# Patient Record
Sex: MALE | Race: WHITE | NOT HISPANIC OR LATINO | Employment: FULL TIME | URBAN - METROPOLITAN AREA
[De-identification: names, ages, dates, MRNs, and addresses within clinical notes are randomized per-mention and may not be internally consistent; named-entity substitution may affect disease eponyms.]

---

## 2017-05-18 ENCOUNTER — TELEPHONE (OUTPATIENT)
Dept: INFECTIOUS DISEASES | Facility: CLINIC | Age: 52
End: 2017-05-18

## 2017-05-18 DIAGNOSIS — Z21 HIV POSITIVE: Primary | ICD-10-CM

## 2017-05-18 DIAGNOSIS — Z12.5 PROSTATE CANCER SCREENING: ICD-10-CM

## 2017-05-18 NOTE — TELEPHONE ENCOUNTER
----- Message from Angelica Yang LPN sent at 5/18/2017  7:32 AM CDT -----  Contact: Oscar   tel:   [phone]       ----- Message -----     From: Veronica Lucas     Sent: 5/17/2017   4:52 PM       To: JOSÉ LUIS Amezcua's pt./    Asking if there are any tests to be done before he sees you on 6/28 at 3:30 pm /Will be on the [facility] on June 9th.  Asking if Dr. Du can see him on a Friday and he would like to do his labs on the [facility] instead of coming into [place].    Pls call ref. This. As per caller.

## 2017-06-10 ENCOUNTER — LAB VISIT (OUTPATIENT)
Dept: LAB | Facility: HOSPITAL | Age: 52
End: 2017-06-10
Attending: INTERNAL MEDICINE
Payer: COMMERCIAL

## 2017-06-10 DIAGNOSIS — Z21 HIV POSITIVE: ICD-10-CM

## 2017-06-10 DIAGNOSIS — Z12.5 PROSTATE CANCER SCREENING: ICD-10-CM

## 2017-06-10 LAB
ALBUMIN SERPL BCP-MCNC: 4 G/DL
ALP SERPL-CCNC: 85 U/L
ALT SERPL W/O P-5'-P-CCNC: 28 U/L
ANION GAP SERPL CALC-SCNC: 9 MMOL/L
AST SERPL-CCNC: 34 U/L
BASOPHILS # BLD AUTO: 0.02 K/UL
BASOPHILS NFR BLD: 0.4 %
BILIRUB SERPL-MCNC: 1.1 MG/DL
BUN SERPL-MCNC: 16 MG/DL
CALCIUM SERPL-MCNC: 9.3 MG/DL
CHLORIDE SERPL-SCNC: 106 MMOL/L
CHOLEST/HDLC SERPL: 4.1 {RATIO}
CO2 SERPL-SCNC: 25 MMOL/L
COMPLEXED PSA SERPL-MCNC: 0.33 NG/ML
CREAT SERPL-MCNC: 1.1 MG/DL
DIFFERENTIAL METHOD: ABNORMAL
EOSINOPHIL # BLD AUTO: 0.1 K/UL
EOSINOPHIL NFR BLD: 2.5 %
ERYTHROCYTE [DISTWIDTH] IN BLOOD BY AUTOMATED COUNT: 12.6 %
EST. GFR  (AFRICAN AMERICAN): >60 ML/MIN/1.73 M^2
EST. GFR  (NON AFRICAN AMERICAN): >60 ML/MIN/1.73 M^2
GLUCOSE SERPL-MCNC: 92 MG/DL
HCT VFR BLD AUTO: 42.5 %
HDL/CHOLESTEROL RATIO: 24.2 %
HDLC SERPL-MCNC: 223 MG/DL
HDLC SERPL-MCNC: 54 MG/DL
HGB BLD-MCNC: 14.9 G/DL
LDLC SERPL CALC-MCNC: 145.4 MG/DL
LYMPHOCYTES # BLD AUTO: 2.7 K/UL
LYMPHOCYTES NFR BLD: 51.7 %
MCH RBC QN AUTO: 37.8 PG
MCHC RBC AUTO-ENTMCNC: 35.1 %
MCV RBC AUTO: 108 FL
MONOCYTES # BLD AUTO: 0.5 K/UL
MONOCYTES NFR BLD: 8.5 %
NEUTROPHILS # BLD AUTO: 2 K/UL
NEUTROPHILS NFR BLD: 36.9 %
NONHDLC SERPL-MCNC: 169 MG/DL
PLATELET # BLD AUTO: 196 K/UL
PMV BLD AUTO: 9.8 FL
POTASSIUM SERPL-SCNC: 4.2 MMOL/L
PROT SERPL-MCNC: 7.1 G/DL
RBC # BLD AUTO: 3.94 M/UL
SODIUM SERPL-SCNC: 140 MMOL/L
TRIGL SERPL-MCNC: 118 MG/DL
WBC # BLD AUTO: 5.28 K/UL

## 2017-06-10 PROCEDURE — 87536 HIV-1 QUANT&REVRSE TRNSCRPJ: CPT

## 2017-06-10 PROCEDURE — 80061 LIPID PANEL: CPT

## 2017-06-10 PROCEDURE — 80053 COMPREHEN METABOLIC PANEL: CPT

## 2017-06-10 PROCEDURE — 36415 COLL VENOUS BLD VENIPUNCTURE: CPT | Mod: PO

## 2017-06-10 PROCEDURE — 84153 ASSAY OF PSA TOTAL: CPT

## 2017-06-10 PROCEDURE — 86361 T CELL ABSOLUTE COUNT: CPT

## 2017-06-10 PROCEDURE — 85025 COMPLETE CBC W/AUTO DIFF WBC: CPT

## 2017-06-12 LAB
CD3+CD4+ CELLS # BLD: 1154 CELLS/UL (ref 300–1400)
CD3+CD4+ CELLS NFR BLD: 40.4 % (ref 28–57)

## 2017-06-14 LAB
HIV UQ DATE RECEIVED: NORMAL
HIV UQ DATE REPORTED: NORMAL
HIV1 RNA # SERPL NAA+PROBE: <40 COPIES/ML
HIV1 RNA SERPL NAA+PROBE-LOG#: <1.6 LOG (10) COPIES/ML
HIV1 RNA SERPL QL NAA+PROBE: NOT DETECTED

## 2017-06-27 ENCOUNTER — HOSPITAL ENCOUNTER (OUTPATIENT)
Dept: CARDIOLOGY | Facility: CLINIC | Age: 52
Discharge: HOME | End: 2017-06-27
Payer: COMMERCIAL

## 2017-06-27 ENCOUNTER — OFFICE VISIT (OUTPATIENT)
Dept: INFECTIOUS DISEASES | Facility: CLINIC | Age: 52
End: 2017-06-27
Payer: COMMERCIAL

## 2017-06-27 VITALS
HEART RATE: 65 BPM | BODY MASS INDEX: 23.41 KG/M2 | TEMPERATURE: 98 F | HEIGHT: 72 IN | SYSTOLIC BLOOD PRESSURE: 132 MMHG | WEIGHT: 172.81 LBS | DIASTOLIC BLOOD PRESSURE: 73 MMHG

## 2017-06-27 DIAGNOSIS — Z00.00 PREVENTATIVE HEALTH CARE: Primary | ICD-10-CM

## 2017-06-27 DIAGNOSIS — B20 HIV INFECTION: Chronic | ICD-10-CM

## 2017-06-27 DIAGNOSIS — Z82.49 FAMILY HISTORY OF CORONARY ARTERY DISEASE: ICD-10-CM

## 2017-06-27 PROCEDURE — 99999 PR PBB SHADOW E&M-EST. PATIENT-LVL III: CPT | Mod: PBBFAC,,, | Performed by: INTERNAL MEDICINE

## 2017-06-27 PROCEDURE — 99396 PREV VISIT EST AGE 40-64: CPT | Mod: S$GLB,,, | Performed by: INTERNAL MEDICINE

## 2017-06-27 PROCEDURE — 93000 ELECTROCARDIOGRAM COMPLETE: CPT | Mod: S$GLB,,, | Performed by: INTERNAL MEDICINE

## 2017-06-27 RX ORDER — TRAZODONE HYDROCHLORIDE 50 MG/1
50 TABLET ORAL
COMMUNITY
End: 2018-08-22 | Stop reason: SDUPTHER

## 2017-06-27 NOTE — PROGRESS NOTES
Subjective:      Patient ID: Oscar Gibbs is a 52 y.o. male.    Chief Complaint:Annual Exam      History of Present Illness    HIV disease  PT remains on viread/kaletra/retrovir for many years. XF3=9897 and HIV viral load is <40. CMP normal. PSA normal. He feels well.    Xanthelasma  His brother who is a physician noted his mild eyelid xanthelasma and asked pt to ask me if he needed treatment. He has had decent lipid profiles in past.  Lab Results   Component Value Date    CHOL 223 (H) 06/10/2017    CHOL 212 (H) 05/28/2016    CHOL 211 (H) 08/08/2015     Lab Results   Component Value Date    HDL 54 06/10/2017    HDL 53 05/28/2016    HDL 51 08/08/2015     Lab Results   Component Value Date    LDLCALC 145.4 06/10/2017    LDLCALC 138.8 05/28/2016    LDLCALC 142.2 08/08/2015     Lab Results   Component Value Date    TRIG 118 06/10/2017    TRIG 101 05/28/2016    TRIG 89 08/08/2015     Lab Results   Component Value Date    CHOLHDL 24.2 06/10/2017    CHOLHDL 25.0 05/28/2016    CHOLHDL 24.2 08/08/2015     I suggested he have a coronary calcium score as he does have a family history of CAD in his father's family.  Has never had EKG.    The laboratory studies were reviewed and discussed with the pt.  PSA is low and stable:  Lab Results   Component Value Date    PSA 0.33 06/10/2017    PSA 0.31 12/03/2016    PSA 0.31 10/10/2009       Last colonoscopy was 2015 and a rescreening interval of 5 years was recommended as he had a couple of polyps.    Review of Systems   Constitution: Negative for chills, decreased appetite, fever, weakness, malaise/fatigue, night sweats, weight gain and weight loss.   HENT: Positive for tinnitus. Negative for congestion, ear pain, headaches, hearing loss, hoarse voice and sore throat.    Eyes: Negative for blurred vision, redness and visual disturbance.   Cardiovascular: Negative for chest pain, leg swelling and palpitations.   Respiratory: Negative for cough, hemoptysis, shortness of breath  and sputum production.    Hematologic/Lymphatic: Negative for adenopathy. Does not bruise/bleed easily.   Skin: Negative for dry skin, itching, rash and suspicious lesions.   Musculoskeletal: Negative for back pain, joint pain, myalgias and neck pain.   Gastrointestinal: Negative for abdominal pain, constipation, diarrhea, heartburn, nausea and vomiting.   Genitourinary: Negative for dysuria, flank pain, frequency, hematuria, hesitancy and urgency.   Neurological: Negative for dizziness, numbness and paresthesias.   Psychiatric/Behavioral: Negative for depression and memory loss. The patient does not have insomnia and is not nervous/anxious.      Objective:   Physical Exam   Constitutional: He is oriented to person, place, and time. He appears well-developed and well-nourished.   HENT:   Head: Normocephalic and atraumatic.   Right Ear: External ear normal.   Left Ear: External ear normal.   Mouth/Throat: Oropharynx is clear and moist.   Eyes: Conjunctivae and EOM are normal. Pupils are equal, round, and reactive to light.   Neck: Normal range of motion. Neck supple. No thyromegaly present.   Cardiovascular: Normal rate, regular rhythm and normal heart sounds.    No murmur heard.  Pulmonary/Chest: Effort normal and breath sounds normal. He has no wheezes. He has no rales.   Abdominal: Soft. Bowel sounds are normal. He exhibits no mass. There is no tenderness. There is no rebound.   Genitourinary: Rectum normal, prostate normal and penis normal.   Musculoskeletal: Normal range of motion.   Lymphadenopathy:     He has no cervical adenopathy.   Neurological: He is alert and oriented to person, place, and time.   Skin: Skin is warm and dry.   Psychiatric: He has a normal mood and affect. His behavior is normal.   Vitals reviewed.    Assessment:       1. Preventative health care    2. Family history of coronary artery disease    3. HIV infection          Plan:        1. Continue present meds   2. Schedule coronary calcium  score and EKG (Monica review)   3. See back in 6 mos

## 2017-07-17 RX ORDER — ZIDOVUDINE 300 MG/1
300 TABLET ORAL 2 TIMES DAILY
Qty: 60 TABLET | Refills: 7 | Status: SHIPPED | OUTPATIENT
Start: 2017-07-17 | End: 2018-03-20 | Stop reason: SDUPTHER

## 2017-11-12 DIAGNOSIS — Z21 HIV POSITIVE: ICD-10-CM

## 2017-11-13 RX ORDER — TENOFOVIR DISOPROXIL FUMARATE 300 MG/1
300 TABLET, COATED ORAL DAILY
Qty: 30 TABLET | Refills: 11 | Status: SHIPPED | OUTPATIENT
Start: 2017-11-13 | End: 2017-11-15 | Stop reason: SDUPTHER

## 2017-11-14 DIAGNOSIS — Z21 HIV POSITIVE: ICD-10-CM

## 2017-11-15 RX ORDER — TENOFOVIR DISOPROXIL FUMARATE 300 MG/1
300 TABLET, COATED ORAL DAILY
Qty: 30 TABLET | Refills: 11 | Status: SHIPPED | OUTPATIENT
Start: 2017-11-15 | End: 2018-11-18 | Stop reason: SDUPTHER

## 2017-11-28 ENCOUNTER — PATIENT MESSAGE (OUTPATIENT)
Dept: INFECTIOUS DISEASES | Facility: CLINIC | Age: 52
End: 2017-11-28

## 2017-12-11 DIAGNOSIS — Z21 HIV POSITIVE: ICD-10-CM

## 2017-12-11 RX ORDER — LOPINAVIR AND RITONAVIR 200; 50 MG/1; MG/1
2 TABLET, FILM COATED ORAL 2 TIMES DAILY
Qty: 180 TABLET | Refills: 7 | Status: SHIPPED | OUTPATIENT
Start: 2017-12-11 | End: 2017-12-15 | Stop reason: SDUPTHER

## 2017-12-14 DIAGNOSIS — Z21 HIV POSITIVE: ICD-10-CM

## 2017-12-15 ENCOUNTER — LAB VISIT (OUTPATIENT)
Dept: LAB | Facility: HOSPITAL | Age: 52
End: 2017-12-15
Attending: INTERNAL MEDICINE
Payer: COMMERCIAL

## 2017-12-15 DIAGNOSIS — Z21 HIV POSITIVE: ICD-10-CM

## 2017-12-15 LAB
ALBUMIN SERPL BCP-MCNC: 4 G/DL
ALP SERPL-CCNC: 84 U/L
ALT SERPL W/O P-5'-P-CCNC: 25 U/L
ANION GAP SERPL CALC-SCNC: 8 MMOL/L
AST SERPL-CCNC: 32 U/L
BASOPHILS # BLD AUTO: 0.04 K/UL
BASOPHILS # BLD AUTO: 0.04 K/UL
BASOPHILS NFR BLD: 0.6 %
BASOPHILS NFR BLD: 0.6 %
BILIRUB SERPL-MCNC: 0.8 MG/DL
BUN SERPL-MCNC: 15 MG/DL
CALCIUM SERPL-MCNC: 9.8 MG/DL
CHLORIDE SERPL-SCNC: 104 MMOL/L
CHOLEST SERPL-MCNC: 222 MG/DL
CHOLEST/HDLC SERPL: 3.8 {RATIO}
CO2 SERPL-SCNC: 28 MMOL/L
COMPLEXED PSA SERPL-MCNC: 0.29 NG/ML
CREAT SERPL-MCNC: 1 MG/DL
DIFFERENTIAL METHOD: ABNORMAL
DIFFERENTIAL METHOD: ABNORMAL
EOSINOPHIL # BLD AUTO: 0.1 K/UL
EOSINOPHIL # BLD AUTO: 0.1 K/UL
EOSINOPHIL NFR BLD: 1.9 %
EOSINOPHIL NFR BLD: 2 %
ERYTHROCYTE [DISTWIDTH] IN BLOOD BY AUTOMATED COUNT: 12.2 %
ERYTHROCYTE [DISTWIDTH] IN BLOOD BY AUTOMATED COUNT: 12.2 %
EST. GFR  (AFRICAN AMERICAN): >60 ML/MIN/1.73 M^2
EST. GFR  (NON AFRICAN AMERICAN): >60 ML/MIN/1.73 M^2
GLUCOSE SERPL-MCNC: 78 MG/DL
HCT VFR BLD AUTO: 38.8 %
HCT VFR BLD AUTO: 40.8 %
HDLC SERPL-MCNC: 59 MG/DL
HDLC SERPL: 26.6 %
HGB BLD-MCNC: 13.6 G/DL
HGB BLD-MCNC: 14.3 G/DL
IMM GRANULOCYTES # BLD AUTO: 0.01 K/UL
IMM GRANULOCYTES # BLD AUTO: 0.01 K/UL
IMM GRANULOCYTES NFR BLD AUTO: 0.1 %
IMM GRANULOCYTES NFR BLD AUTO: 0.2 %
LDLC SERPL CALC-MCNC: 138.2 MG/DL
LYMPHOCYTES # BLD AUTO: 2.6 K/UL
LYMPHOCYTES # BLD AUTO: 2.8 K/UL
LYMPHOCYTES NFR BLD: 38.9 %
LYMPHOCYTES NFR BLD: 39.9 %
MCH RBC QN AUTO: 37.3 PG
MCH RBC QN AUTO: 38.1 PG
MCHC RBC AUTO-ENTMCNC: 35 G/DL
MCHC RBC AUTO-ENTMCNC: 35.1 G/DL
MCV RBC AUTO: 106 FL
MCV RBC AUTO: 109 FL
MONOCYTES # BLD AUTO: 0.6 K/UL
MONOCYTES # BLD AUTO: 0.6 K/UL
MONOCYTES NFR BLD: 8.3 %
MONOCYTES NFR BLD: 8.3 %
NEUTROPHILS # BLD AUTO: 3.3 K/UL
NEUTROPHILS # BLD AUTO: 3.4 K/UL
NEUTROPHILS NFR BLD: 49.2 %
NEUTROPHILS NFR BLD: 50 %
NONHDLC SERPL-MCNC: 163 MG/DL
NRBC BLD-RTO: 0 /100 WBC
NRBC BLD-RTO: 0 /100 WBC
PLATELET # BLD AUTO: 225 K/UL
PLATELET # BLD AUTO: 233 K/UL
PMV BLD AUTO: 9.9 FL
PMV BLD AUTO: 9.9 FL
POTASSIUM SERPL-SCNC: 3.9 MMOL/L
PROT SERPL-MCNC: 7.2 G/DL
RBC # BLD AUTO: 3.65 M/UL
RBC # BLD AUTO: 3.75 M/UL
SODIUM SERPL-SCNC: 140 MMOL/L
TRIGL SERPL-MCNC: 124 MG/DL
WBC # BLD AUTO: 6.61 K/UL
WBC # BLD AUTO: 6.9 K/UL

## 2017-12-15 PROCEDURE — 80061 LIPID PANEL: CPT

## 2017-12-15 PROCEDURE — 80053 COMPREHEN METABOLIC PANEL: CPT

## 2017-12-15 PROCEDURE — 86361 T CELL ABSOLUTE COUNT: CPT

## 2017-12-15 PROCEDURE — 87536 HIV-1 QUANT&REVRSE TRNSCRPJ: CPT

## 2017-12-15 PROCEDURE — 84153 ASSAY OF PSA TOTAL: CPT

## 2017-12-15 PROCEDURE — 85025 COMPLETE CBC W/AUTO DIFF WBC: CPT | Mod: 91

## 2017-12-15 PROCEDURE — 36415 COLL VENOUS BLD VENIPUNCTURE: CPT | Mod: PO

## 2017-12-15 RX ORDER — LOPINAVIR AND RITONAVIR 200; 50 MG/1; MG/1
2 TABLET, FILM COATED ORAL 2 TIMES DAILY
Qty: 180 TABLET | Refills: 7 | Status: SHIPPED | OUTPATIENT
Start: 2017-12-15 | End: 2019-01-14 | Stop reason: SDUPTHER

## 2017-12-18 LAB
CD3+CD4+ CELLS # BLD: 1082 CELLS/UL (ref 300–1400)
CD3+CD4+ CELLS NFR BLD: 37.8 % (ref 28–57)

## 2018-01-30 ENCOUNTER — PATIENT MESSAGE (OUTPATIENT)
Dept: INFECTIOUS DISEASES | Facility: CLINIC | Age: 53
End: 2018-01-30

## 2018-02-19 ENCOUNTER — HOSPITAL ENCOUNTER (OUTPATIENT)
Dept: RADIOLOGY | Facility: HOSPITAL | Age: 53
Discharge: HOME | End: 2018-02-19
Attending: INTERNAL MEDICINE
Payer: COMMERCIAL

## 2018-02-19 ENCOUNTER — CLINICAL SUPPORT (OUTPATIENT)
Dept: INFECTIOUS DISEASES | Facility: CLINIC | Age: 53
End: 2018-02-19
Payer: COMMERCIAL

## 2018-02-19 ENCOUNTER — OFFICE VISIT (OUTPATIENT)
Dept: INFECTIOUS DISEASES | Facility: CLINIC | Age: 53
End: 2018-02-19
Payer: COMMERCIAL

## 2018-02-19 VITALS
DIASTOLIC BLOOD PRESSURE: 71 MMHG | BODY MASS INDEX: 23.53 KG/M2 | SYSTOLIC BLOOD PRESSURE: 127 MMHG | WEIGHT: 173.75 LBS | HEIGHT: 72 IN | HEART RATE: 60 BPM | TEMPERATURE: 98 F

## 2018-02-19 DIAGNOSIS — Z00.00 PREVENTATIVE HEALTH CARE: Primary | ICD-10-CM

## 2018-02-19 DIAGNOSIS — Z82.49 FAMILY HISTORY OF CORONARY ARTERY DISEASE: ICD-10-CM

## 2018-02-19 DIAGNOSIS — B20 HIV INFECTION: Chronic | ICD-10-CM

## 2018-02-19 DIAGNOSIS — Z91.89 AT RISK FOR CORONARY ARTERY DISEASE: ICD-10-CM

## 2018-02-19 DIAGNOSIS — E78.00 HYPERCHOLESTEROLEMIA: ICD-10-CM

## 2018-02-19 DIAGNOSIS — Z00.00 PREVENTATIVE HEALTH CARE: ICD-10-CM

## 2018-02-19 PROCEDURE — 99999 PR PBB SHADOW E&M-EST. PATIENT-LVL III: CPT | Mod: PBBFAC,,, | Performed by: INTERNAL MEDICINE

## 2018-02-19 PROCEDURE — 90471 IMMUNIZATION ADMIN: CPT | Mod: S$GLB,,, | Performed by: INTERNAL MEDICINE

## 2018-02-19 PROCEDURE — 75571 CT HRT W/O DYE W/CA TEST: CPT | Mod: 26,,, | Performed by: RADIOLOGY

## 2018-02-19 PROCEDURE — 99214 OFFICE O/P EST MOD 30 MIN: CPT | Mod: 25,S$GLB,, | Performed by: INTERNAL MEDICINE

## 2018-02-19 PROCEDURE — 99999 PR PBB SHADOW E&M-EST. PATIENT-LVL I: CPT | Mod: PBBFAC,,,

## 2018-02-19 PROCEDURE — 3008F BODY MASS INDEX DOCD: CPT | Mod: S$GLB,,, | Performed by: INTERNAL MEDICINE

## 2018-02-19 PROCEDURE — 90732 PPSV23 VACC 2 YRS+ SUBQ/IM: CPT | Mod: S$GLB,,, | Performed by: INTERNAL MEDICINE

## 2018-02-19 PROCEDURE — 75571 CT HRT W/O DYE W/CA TEST: CPT | Mod: TC

## 2018-02-19 NOTE — PROGRESS NOTES
Subjective:      Patient ID: Oscar Gibbs is a 52 y.o. male.    Chief Complaint:Follow-up      History of Present Illness    HIV disease  PT remains on viread/kaletra/retrovir for many years. KK9=1096 and HIV viral load is <40. CMP normal. PSA normal. He feels well.     Xanthelasma  His brother who is a physician noted his mild eyelid xanthelasma and asked pt to ask me if he needed treatment. He has had decent lipid profiles in past.  Lab Results   Component Value Date    CHOL 222 (H) 12/15/2017    CHOL 223 (H) 06/10/2017    CHOL 212 (H) 05/28/2016     Lab Results   Component Value Date    HDL 59 12/15/2017    HDL 54 06/10/2017    HDL 53 05/28/2016     Lab Results   Component Value Date    LDLCALC 138.2 12/15/2017    LDLCALC 145.4 06/10/2017    LDLCALC 138.8 05/28/2016     Lab Results   Component Value Date    TRIG 124 12/15/2017    TRIG 118 06/10/2017    TRIG 101 05/28/2016     Lab Results   Component Value Date    CHOLHDL 26.6 12/15/2017    CHOLHDL 24.2 06/10/2017    CHOLHDL 25.0 05/28/2016       I suggested he have a coronary calcium score as he does have a family history of CAD in his father's family.    EKG June 2017 was normal..     The laboratory studies were reviewed and discussed with the pt.  PSA is low and stable:  Lab Results   Component Value Date    PSA 0.29 12/15/2017    PSA 0.33 06/10/2017    PSA 0.31 12/03/2016          Last colonoscopy was 2015 and a rescreening interval of 5 years was recommended as he had a couple of polyps.         Review of Systems   Constitution: Negative for chills, decreased appetite, fever, weakness, malaise/fatigue, night sweats, weight gain and weight loss.   HENT: Positive for congestion and tinnitus. Negative for ear pain, hearing loss, hoarse voice and sore throat.    Eyes: Negative for blurred vision, redness and visual disturbance.   Cardiovascular: Negative for chest pain, leg swelling and palpitations.   Respiratory: Negative for cough, hemoptysis,  shortness of breath and sputum production.    Hematologic/Lymphatic: Negative for adenopathy. Does not bruise/bleed easily.   Skin: Negative for dry skin, itching, rash and suspicious lesions.   Musculoskeletal: Negative for back pain, joint pain, myalgias and neck pain.   Gastrointestinal: Negative for abdominal pain, constipation, diarrhea, heartburn, nausea and vomiting.   Genitourinary: Negative for dysuria, flank pain, frequency, hematuria, hesitancy and urgency.   Neurological: Positive for numbness. Negative for dizziness, headaches and paresthesias.   Psychiatric/Behavioral: Negative for depression and memory loss. The patient does not have insomnia and is not nervous/anxious.      Objective:   Physical Exam   Constitutional: He is oriented to person, place, and time. He appears well-developed and well-nourished.   HENT:   Head: Normocephalic and atraumatic.   Right Ear: External ear normal.   Left Ear: External ear normal.   Mouth/Throat: Oropharynx is clear and moist.   Eyes: Conjunctivae and EOM are normal. Pupils are equal, round, and reactive to light.   Neck: Normal range of motion. Neck supple. No thyromegaly present.   Cardiovascular: Normal rate, regular rhythm and normal heart sounds.    No murmur heard.  Pulmonary/Chest: Effort normal and breath sounds normal. He has no wheezes. He has no rales.   Abdominal: Soft. Bowel sounds are normal. He exhibits no mass. There is no tenderness. There is no rebound.   Musculoskeletal: Normal range of motion.   Lymphadenopathy:     He has no cervical adenopathy.   Neurological: He is alert and oriented to person, place, and time.   Skin: Skin is warm and dry.   Psychiatric: He has a normal mood and affect. His behavior is normal.   Vitals reviewed.    Assessment:       1. Preventative health care    2. At risk for coronary artery disease    3. HIV infection    4. Hypercholesterolemia          Plan:        1. Continue present meds   2. Coronary calcium score;  consider statin rx   3. Pneumovax   4. See back in 4-6 mos

## 2018-03-14 RX ORDER — ZIDOVUDINE 300 MG/1
300 TABLET ORAL 2 TIMES DAILY
Qty: 60 TABLET | Refills: 7 | Status: SHIPPED | OUTPATIENT
Start: 2018-03-14 | End: 2018-11-19

## 2018-03-20 RX ORDER — ZIDOVUDINE 300 MG/1
300 TABLET ORAL 2 TIMES DAILY
Qty: 60 TABLET | Refills: 7 | Status: SHIPPED | OUTPATIENT
Start: 2018-03-20 | End: 2019-04-12 | Stop reason: SDUPTHER

## 2018-07-26 ENCOUNTER — TELEPHONE (OUTPATIENT)
Dept: INFECTIOUS DISEASES | Facility: CLINIC | Age: 53
End: 2018-07-26

## 2018-07-26 ENCOUNTER — TELEPHONE (OUTPATIENT)
Dept: INFECTIOUS DISEASES | Facility: HOSPITAL | Age: 53
End: 2018-07-26

## 2018-07-26 DIAGNOSIS — B20 HIV INFECTION: Primary | ICD-10-CM

## 2018-07-26 DIAGNOSIS — Z21 HIV POSITIVE: Primary | ICD-10-CM

## 2018-08-04 ENCOUNTER — LAB VISIT (OUTPATIENT)
Dept: LAB | Facility: HOSPITAL | Age: 53
End: 2018-08-04
Attending: INTERNAL MEDICINE
Payer: COMMERCIAL

## 2018-08-04 DIAGNOSIS — Z21 HIV POSITIVE: ICD-10-CM

## 2018-08-04 LAB
ALBUMIN SERPL BCP-MCNC: 4.2 G/DL
ALP SERPL-CCNC: 74 U/L
ALT SERPL W/O P-5'-P-CCNC: 22 U/L
ANION GAP SERPL CALC-SCNC: 9 MMOL/L
AST SERPL-CCNC: 28 U/L
BASOPHILS # BLD AUTO: 0.04 K/UL
BASOPHILS NFR BLD: 0.6 %
BILIRUB SERPL-MCNC: 1.3 MG/DL
BUN SERPL-MCNC: 12 MG/DL
CALCIUM SERPL-MCNC: 9.8 MG/DL
CHLORIDE SERPL-SCNC: 105 MMOL/L
CHOLEST SERPL-MCNC: 199 MG/DL
CHOLEST/HDLC SERPL: 3.8 {RATIO}
CO2 SERPL-SCNC: 26 MMOL/L
COMPLEXED PSA SERPL-MCNC: 0.35 NG/ML
CREAT SERPL-MCNC: 1.1 MG/DL
DIFFERENTIAL METHOD: ABNORMAL
EOSINOPHIL # BLD AUTO: 0.1 K/UL
EOSINOPHIL NFR BLD: 2.2 %
ERYTHROCYTE [DISTWIDTH] IN BLOOD BY AUTOMATED COUNT: 12.6 %
EST. GFR  (AFRICAN AMERICAN): >60 ML/MIN/1.73 M^2
EST. GFR  (NON AFRICAN AMERICAN): >60 ML/MIN/1.73 M^2
GLUCOSE SERPL-MCNC: 100 MG/DL
HCT VFR BLD AUTO: 42.3 %
HDLC SERPL-MCNC: 53 MG/DL
HDLC SERPL: 26.6 %
HGB BLD-MCNC: 14.8 G/DL
IMM GRANULOCYTES # BLD AUTO: 0.02 K/UL
IMM GRANULOCYTES NFR BLD AUTO: 0.3 %
LDLC SERPL CALC-MCNC: 124.8 MG/DL
LYMPHOCYTES # BLD AUTO: 2.5 K/UL
LYMPHOCYTES NFR BLD: 38.1 %
MCH RBC QN AUTO: 38.2 PG
MCHC RBC AUTO-ENTMCNC: 35 G/DL
MCV RBC AUTO: 109 FL
MONOCYTES # BLD AUTO: 0.6 K/UL
MONOCYTES NFR BLD: 9.8 %
NEUTROPHILS # BLD AUTO: 3.2 K/UL
NEUTROPHILS NFR BLD: 49 %
NONHDLC SERPL-MCNC: 146 MG/DL
NRBC BLD-RTO: 0 /100 WBC
PLATELET # BLD AUTO: 206 K/UL
PMV BLD AUTO: 9.9 FL
POTASSIUM SERPL-SCNC: 4.1 MMOL/L
PROT SERPL-MCNC: 7.1 G/DL
RBC # BLD AUTO: 3.87 M/UL
SODIUM SERPL-SCNC: 140 MMOL/L
TRIGL SERPL-MCNC: 106 MG/DL
WBC # BLD AUTO: 6.51 K/UL

## 2018-08-04 PROCEDURE — 80061 LIPID PANEL: CPT

## 2018-08-04 PROCEDURE — 84153 ASSAY OF PSA TOTAL: CPT

## 2018-08-04 PROCEDURE — 87536 HIV-1 QUANT&REVRSE TRNSCRPJ: CPT

## 2018-08-04 PROCEDURE — 85025 COMPLETE CBC W/AUTO DIFF WBC: CPT

## 2018-08-04 PROCEDURE — 86361 T CELL ABSOLUTE COUNT: CPT

## 2018-08-04 PROCEDURE — 80053 COMPREHEN METABOLIC PANEL: CPT

## 2018-08-04 PROCEDURE — 36415 COLL VENOUS BLD VENIPUNCTURE: CPT | Mod: PO

## 2018-08-06 LAB
CD3+CD4+ CELLS # BLD: 997 CELLS/UL (ref 300–1400)
CD3+CD4+ CELLS NFR BLD: 39.1 % (ref 28–57)

## 2018-08-22 ENCOUNTER — OFFICE VISIT (OUTPATIENT)
Dept: INFECTIOUS DISEASES | Facility: CLINIC | Age: 53
End: 2018-08-22
Payer: COMMERCIAL

## 2018-08-22 VITALS
BODY MASS INDEX: 22.97 KG/M2 | DIASTOLIC BLOOD PRESSURE: 73 MMHG | WEIGHT: 169.56 LBS | HEIGHT: 72 IN | SYSTOLIC BLOOD PRESSURE: 122 MMHG | TEMPERATURE: 98 F | HEART RATE: 59 BPM

## 2018-08-22 DIAGNOSIS — B20 HIV INFECTION: Chronic | ICD-10-CM

## 2018-08-22 DIAGNOSIS — Z00.00 PREVENTATIVE HEALTH CARE: Primary | ICD-10-CM

## 2018-08-22 PROCEDURE — 99999 PR PBB SHADOW E&M-EST. PATIENT-LVL III: CPT | Mod: PBBFAC,,, | Performed by: INTERNAL MEDICINE

## 2018-08-22 PROCEDURE — 99396 PREV VISIT EST AGE 40-64: CPT | Mod: S$GLB,,, | Performed by: INTERNAL MEDICINE

## 2018-08-22 RX ORDER — TRAZODONE HYDROCHLORIDE 50 MG/1
50 TABLET ORAL
Qty: 25 TABLET | Refills: 3 | Status: SHIPPED | OUTPATIENT
Start: 2018-08-22 | End: 2022-10-27 | Stop reason: SDUPTHER

## 2018-08-22 RX ORDER — SILDENAFIL 100 MG/1
TABLET, FILM COATED ORAL
Qty: 2 TABLET | Refills: 6 | Status: SHIPPED | OUTPATIENT
Start: 2018-08-22 | End: 2023-06-28 | Stop reason: SDUPTHER

## 2018-08-22 NOTE — PROGRESS NOTES
Subjective:      Patient ID: Oscar Gibbs is a 53 y.o. male.    Chief Complaint:Annual Exam      History of Present Illness  HIV disease  PT remains on viread/kaletra/retrovir for many years. TS7=8214 and HIV viral load is <40. CMP normal. PSA normal. He feels well.     Xanthelasma  His brother who is a physician noted his mild eyelid xanthelasma and asked pt to ask me if he needed treatment. He has had decent lipid profiles in past.     Lab Results   Component Value Date    CHOL 199 08/04/2018    CHOL 222 (H) 12/15/2017    CHOL 223 (H) 06/10/2017     Lab Results   Component Value Date    HDL 53 08/04/2018    HDL 59 12/15/2017    HDL 54 06/10/2017     Lab Results   Component Value Date    LDLCALC 124.8 08/04/2018    LDLCALC 138.2 12/15/2017    LDLCALC 145.4 06/10/2017     Lab Results   Component Value Date    TRIG 106 08/04/2018    TRIG 124 12/15/2017    TRIG 118 06/10/2017     Lab Results   Component Value Date    CHOLHDL 26.6 08/04/2018    CHOLHDL 26.6 12/15/2017    CHOLHDL 24.2 06/10/2017          His coronary calcium score done in Feb was zero.     The laboratory studies were reviewed and discussed with the pt.  PSA is low and stable:   Lab Results   Component Value Date    PSA 0.35 08/04/2018    PSA 0.29 12/15/2017    PSA 0.33 06/10/2017                Last colonoscopy was 2015 and a rescreening interval of 5 years was recommended as he had a couple of polyps.           Review of Systems   Constitution: Negative for chills, decreased appetite, fever, weakness, malaise/fatigue, night sweats, weight gain and weight loss.   HENT: Positive for congestion and tinnitus. Negative for ear pain, hearing loss, hoarse voice and sore throat.    Eyes: Negative for blurred vision, redness and visual disturbance.   Cardiovascular: Negative for chest pain, leg swelling and palpitations.   Respiratory: Negative for cough, hemoptysis, shortness of breath and sputum production.    Hematologic/Lymphatic: Negative for  adenopathy. Does not bruise/bleed easily.   Skin: Negative for dry skin, itching, rash and suspicious lesions.   Musculoskeletal: Negative for back pain, joint pain, myalgias and neck pain.   Gastrointestinal: Negative for abdominal pain, constipation, diarrhea, heartburn, nausea and vomiting.   Genitourinary: Negative for dysuria, flank pain, frequency, hematuria, hesitancy and urgency.   Neurological: Negative for dizziness, headaches, numbness and paresthesias.   Psychiatric/Behavioral: Negative for depression and memory loss. The patient does not have insomnia and is not nervous/anxious.      Objective:   Physical Exam   Constitutional: He is oriented to person, place, and time. He appears well-developed and well-nourished.   HENT:   Head: Normocephalic and atraumatic.   Right Ear: External ear normal.   Left Ear: External ear normal.   Mouth/Throat: Oropharynx is clear and moist.   Eyes: Conjunctivae and EOM are normal. Pupils are equal, round, and reactive to light.   Neck: Normal range of motion. Neck supple. No thyromegaly present.   Cardiovascular: Normal rate, regular rhythm and normal heart sounds.   No murmur heard.  Pulmonary/Chest: Effort normal and breath sounds normal. He has no wheezes. He has no rales.   Musculoskeletal: Normal range of motion.   Lymphadenopathy:     He has no cervical adenopathy.   Neurological: He is alert and oriented to person, place, and time.   Skin: Skin is warm and dry.   Psychiatric: He has a normal mood and affect. His behavior is normal.   Vitals reviewed.    Assessment:       1. Preventative health care    2. HIV infection          Plan:        1. Continue present meds   2. Shingrix series when available   3. See back in Dec

## 2018-11-18 DIAGNOSIS — Z21 HIV POSITIVE: ICD-10-CM

## 2018-11-19 RX ORDER — TENOFOVIR DISOPROXIL FUMARATE 300 MG/1
TABLET, COATED ORAL
Qty: 30 TABLET | Refills: 11 | Status: SHIPPED | OUTPATIENT
Start: 2018-11-19 | End: 2019-11-18 | Stop reason: SDUPTHER

## 2019-01-14 DIAGNOSIS — Z21 HIV POSITIVE: ICD-10-CM

## 2019-01-14 RX ORDER — LOPINAVIR AND RITONAVIR 200; 50 MG/1; MG/1
2 TABLET, FILM COATED ORAL 2 TIMES DAILY
Qty: 180 TABLET | Refills: 7 | Status: SHIPPED | OUTPATIENT
Start: 2019-01-14 | End: 2020-01-30

## 2019-03-27 ENCOUNTER — TELEPHONE (OUTPATIENT)
Dept: INFECTIOUS DISEASES | Facility: CLINIC | Age: 54
End: 2019-03-27

## 2019-03-27 DIAGNOSIS — Z00.00 PREVENTATIVE HEALTH CARE: Primary | ICD-10-CM

## 2019-03-27 NOTE — TELEPHONE ENCOUNTER
----- Message from Pita Dahl MA sent at 3/27/2019 10:04 AM CDT -----  Contact: Self [phone]  Please add labs.       ----- Message -----  From: Sima Charles  Sent: 3/27/2019   9:53 AM  To: Ana Laura NEGRON Staff    Pt is requesting orders for labs to be scheduled for 4.13.19 at 10:30am at [place].  I attempted to schedule but the Saturday lab at that location would not offer any time slots.    Pt may be reached either through his patient portal or at [phone].    Thank you.  MELISSA

## 2019-04-12 RX ORDER — ZIDOVUDINE 300 MG/1
300 TABLET ORAL 2 TIMES DAILY
Qty: 60 TABLET | Refills: 4 | Status: SHIPPED | OUTPATIENT
Start: 2019-04-12 | End: 2019-09-14 | Stop reason: SDUPTHER

## 2019-04-13 ENCOUNTER — LAB VISIT (OUTPATIENT)
Dept: LAB | Facility: HOSPITAL | Age: 54
End: 2019-04-13
Attending: INTERNAL MEDICINE
Payer: COMMERCIAL

## 2019-04-13 DIAGNOSIS — Z00.00 PREVENTATIVE HEALTH CARE: ICD-10-CM

## 2019-04-13 LAB
ALBUMIN SERPL BCP-MCNC: 4.3 G/DL (ref 3.5–5.2)
ALP SERPL-CCNC: 81 U/L (ref 55–135)
ALT SERPL W/O P-5'-P-CCNC: 31 U/L (ref 10–44)
ANION GAP SERPL CALC-SCNC: 10 MMOL/L (ref 8–16)
AST SERPL-CCNC: 38 U/L (ref 10–40)
BASOPHILS # BLD AUTO: 0.05 K/UL (ref 0–0.2)
BASOPHILS NFR BLD: 0.8 % (ref 0–1.9)
BILIRUB SERPL-MCNC: 1.6 MG/DL (ref 0.1–1)
BUN SERPL-MCNC: 13 MG/DL (ref 6–20)
CALCIUM SERPL-MCNC: 10.2 MG/DL (ref 8.7–10.5)
CHLORIDE SERPL-SCNC: 103 MMOL/L (ref 95–110)
CHOLEST SERPL-MCNC: 230 MG/DL (ref 120–199)
CHOLEST/HDLC SERPL: 4 {RATIO} (ref 2–5)
CO2 SERPL-SCNC: 26 MMOL/L (ref 23–29)
COMPLEXED PSA SERPL-MCNC: 0.3 NG/ML (ref 0–4)
CREAT SERPL-MCNC: 1.1 MG/DL (ref 0.5–1.4)
DIFFERENTIAL METHOD: ABNORMAL
EOSINOPHIL # BLD AUTO: 0.2 K/UL (ref 0–0.5)
EOSINOPHIL NFR BLD: 2.4 % (ref 0–8)
ERYTHROCYTE [DISTWIDTH] IN BLOOD BY AUTOMATED COUNT: 12.8 % (ref 11.5–14.5)
ERYTHROCYTE [SEDIMENTATION RATE] IN BLOOD BY WESTERGREN METHOD: 2 MM/HR (ref 0–10)
EST. GFR  (AFRICAN AMERICAN): >60 ML/MIN/1.73 M^2
EST. GFR  (NON AFRICAN AMERICAN): >60 ML/MIN/1.73 M^2
GLUCOSE SERPL-MCNC: 95 MG/DL (ref 70–110)
HCT VFR BLD AUTO: 45.6 % (ref 40–54)
HDLC SERPL-MCNC: 58 MG/DL (ref 40–75)
HDLC SERPL: 25.2 % (ref 20–50)
HGB BLD-MCNC: 15.6 G/DL (ref 14–18)
IMM GRANULOCYTES # BLD AUTO: 0.01 K/UL (ref 0–0.04)
IMM GRANULOCYTES NFR BLD AUTO: 0.2 % (ref 0–0.5)
LDLC SERPL CALC-MCNC: 153.2 MG/DL (ref 63–159)
LYMPHOCYTES # BLD AUTO: 2.5 K/UL (ref 1–4.8)
LYMPHOCYTES NFR BLD: 39.8 % (ref 18–48)
MCH RBC QN AUTO: 37.4 PG (ref 27–31)
MCHC RBC AUTO-ENTMCNC: 34.2 G/DL (ref 32–36)
MCV RBC AUTO: 109 FL (ref 82–98)
MONOCYTES # BLD AUTO: 0.6 K/UL (ref 0.3–1)
MONOCYTES NFR BLD: 9 % (ref 4–15)
NEUTROPHILS # BLD AUTO: 3 K/UL (ref 1.8–7.7)
NEUTROPHILS NFR BLD: 47.8 % (ref 38–73)
NONHDLC SERPL-MCNC: 172 MG/DL
NRBC BLD-RTO: 0 /100 WBC
PLATELET # BLD AUTO: 232 K/UL (ref 150–350)
PMV BLD AUTO: 9.8 FL (ref 9.2–12.9)
POTASSIUM SERPL-SCNC: 4.4 MMOL/L (ref 3.5–5.1)
PROT SERPL-MCNC: 7.5 G/DL (ref 6–8.4)
RBC # BLD AUTO: 4.17 M/UL (ref 4.6–6.2)
SODIUM SERPL-SCNC: 139 MMOL/L (ref 136–145)
TRIGL SERPL-MCNC: 94 MG/DL (ref 30–150)
WBC # BLD AUTO: 6.21 K/UL (ref 3.9–12.7)

## 2019-04-13 PROCEDURE — 85651 RBC SED RATE NONAUTOMATED: CPT | Mod: PO

## 2019-04-13 PROCEDURE — 80061 LIPID PANEL: CPT

## 2019-04-13 PROCEDURE — 87536 HIV-1 QUANT&REVRSE TRNSCRPJ: CPT

## 2019-04-13 PROCEDURE — 85025 COMPLETE CBC W/AUTO DIFF WBC: CPT

## 2019-04-13 PROCEDURE — 36415 COLL VENOUS BLD VENIPUNCTURE: CPT | Mod: PO

## 2019-04-13 PROCEDURE — 84153 ASSAY OF PSA TOTAL: CPT

## 2019-04-13 PROCEDURE — 86361 T CELL ABSOLUTE COUNT: CPT

## 2019-04-13 PROCEDURE — 80053 COMPREHEN METABOLIC PANEL: CPT

## 2019-04-15 LAB
CD3+CD4+ CELLS # BLD: 1186 CELLS/UL (ref 300–1400)
CD3+CD4+ CELLS NFR BLD: 39 % (ref 28–57)

## 2019-04-24 ENCOUNTER — CLINICAL SUPPORT (OUTPATIENT)
Dept: INFECTIOUS DISEASES | Facility: CLINIC | Age: 54
End: 2019-04-24
Payer: COMMERCIAL

## 2019-04-24 ENCOUNTER — HOSPITAL ENCOUNTER (OUTPATIENT)
Dept: CARDIOLOGY | Facility: CLINIC | Age: 54
Discharge: HOME | End: 2019-04-24
Payer: COMMERCIAL

## 2019-04-24 ENCOUNTER — OFFICE VISIT (OUTPATIENT)
Dept: INFECTIOUS DISEASES | Facility: CLINIC | Age: 54
End: 2019-04-24
Payer: COMMERCIAL

## 2019-04-24 VITALS
HEIGHT: 72 IN | SYSTOLIC BLOOD PRESSURE: 131 MMHG | HEART RATE: 59 BPM | DIASTOLIC BLOOD PRESSURE: 80 MMHG | TEMPERATURE: 98 F | BODY MASS INDEX: 23.11 KG/M2 | WEIGHT: 170.63 LBS

## 2019-04-24 DIAGNOSIS — Z00.00 PREVENTATIVE HEALTH CARE: ICD-10-CM

## 2019-04-24 DIAGNOSIS — E78.00 HYPERCHOLESTEROLEMIA: ICD-10-CM

## 2019-04-24 DIAGNOSIS — B20 HIV INFECTION: Primary | Chronic | ICD-10-CM

## 2019-04-24 PROCEDURE — 90750 ZOSTER RECOMBINANT VACCINE: ICD-10-PCS | Mod: S$GLB,,, | Performed by: INTERNAL MEDICINE

## 2019-04-24 PROCEDURE — 99214 OFFICE O/P EST MOD 30 MIN: CPT | Mod: 25,S$GLB,, | Performed by: INTERNAL MEDICINE

## 2019-04-24 PROCEDURE — 99214 PR OFFICE/OUTPT VISIT, EST, LEVL IV, 30-39 MIN: ICD-10-PCS | Mod: 25,S$GLB,, | Performed by: INTERNAL MEDICINE

## 2019-04-24 PROCEDURE — 93000 ELECTROCARDIOGRAM COMPLETE: CPT | Mod: S$GLB,,, | Performed by: INTERNAL MEDICINE

## 2019-04-24 PROCEDURE — 3008F BODY MASS INDEX DOCD: CPT | Mod: CPTII,S$GLB,, | Performed by: INTERNAL MEDICINE

## 2019-04-24 PROCEDURE — 99999 PR PBB SHADOW E&M-EST. PATIENT-LVL III: ICD-10-PCS | Mod: PBBFAC,,, | Performed by: INTERNAL MEDICINE

## 2019-04-24 PROCEDURE — 90471 ZOSTER RECOMBINANT VACCINE: ICD-10-PCS | Mod: S$GLB,,, | Performed by: INTERNAL MEDICINE

## 2019-04-24 PROCEDURE — 99999 PR PBB SHADOW E&M-EST. PATIENT-LVL III: CPT | Mod: PBBFAC,,, | Performed by: INTERNAL MEDICINE

## 2019-04-24 PROCEDURE — 3008F PR BODY MASS INDEX (BMI) DOCUMENTED: ICD-10-PCS | Mod: CPTII,S$GLB,, | Performed by: INTERNAL MEDICINE

## 2019-04-24 PROCEDURE — 90471 IMMUNIZATION ADMIN: CPT | Mod: S$GLB,,, | Performed by: INTERNAL MEDICINE

## 2019-04-24 PROCEDURE — 93000 EKG 12-LEAD: ICD-10-PCS | Mod: S$GLB,,, | Performed by: INTERNAL MEDICINE

## 2019-04-24 PROCEDURE — 90750 HZV VACC RECOMBINANT IM: CPT | Mod: S$GLB,,, | Performed by: INTERNAL MEDICINE

## 2019-04-24 RX ORDER — TRIAMCINOLONE ACETONIDE 1 MG/ML
LOTION TOPICAL
COMMUNITY
Start: 2018-09-14

## 2019-04-24 NOTE — PROGRESS NOTES
Subjective:      Patient ID: Oscar Gibbs is a 53 y.o. male.    Chief Complaint:Annual Exam      History of Present Illness    HIV disease  PT remains on viread/kaletra/retrovir for many years. CV3=3447 and HIV viral load is <40. CMP normal.   He feels well.     Xanthelasma  His brother who is a physician noted his mild eyelid xanthelasma and asked pt to ask me if he needed treatment. He has had decent lipid profiles in past. I do not think his xanthelasma are caused by his very mild cholesterol elevation:     Lab Results   Component Value Date    CHOL 230 (H) 04/13/2019    CHOL 199 08/04/2018    CHOL 222 (H) 12/15/2017     Lab Results   Component Value Date    HDL 58 04/13/2019    HDL 53 08/04/2018    HDL 59 12/15/2017     Lab Results   Component Value Date    LDLCALC 153.2 04/13/2019    LDLCALC 124.8 08/04/2018    LDLCALC 138.2 12/15/2017     Lab Results   Component Value Date    TRIG 94 04/13/2019    TRIG 106 08/04/2018    TRIG 124 12/15/2017     Lab Results   Component Value Date    CHOLHDL 25.2 04/13/2019    CHOLHDL 26.6 08/04/2018    CHOLHDL 26.6 12/15/2017             His coronary calcium score done in Feb was zero.   We discussed use of a statin but given his calcium score and mild elevation, I did not recommend a statin at this point.    The laboratory studies were reviewed and discussed with the pt.                                             Review of Systems   Constitution: Negative for chills, decreased appetite, fever, malaise/fatigue, night sweats, weight gain and weight loss.   HENT: Positive for tinnitus. Negative for congestion, ear pain, hearing loss, hoarse voice and sore throat.    Eyes: Negative for blurred vision, redness and visual disturbance.   Cardiovascular: Negative for chest pain, leg swelling and palpitations.   Respiratory: Negative for cough, hemoptysis, shortness of breath and sputum production.    Hematologic/Lymphatic: Negative for adenopathy. Does not bruise/bleed easily.    Skin: Negative for dry skin, itching, rash and suspicious lesions.   Musculoskeletal: Positive for joint pain. Negative for back pain, myalgias and neck pain.   Gastrointestinal: Negative for abdominal pain, constipation, diarrhea, heartburn, nausea and vomiting.   Genitourinary: Negative for dysuria, flank pain, frequency, hematuria, hesitancy and urgency.   Neurological: Negative for dizziness, headaches, numbness, paresthesias and weakness.   Psychiatric/Behavioral: Negative for depression and memory loss. The patient does not have insomnia and is not nervous/anxious.      Objective:   Physical Exam   Constitutional: He is oriented to person, place, and time. He appears well-developed and well-nourished. No distress.   HENT:   Right Ear: External ear normal.   Left Ear: External ear normal.   Mouth/Throat: Oropharynx is clear and moist.   Neck: No thyromegaly present.   Cardiovascular: Normal rate, regular rhythm and normal heart sounds. Exam reveals no gallop and no friction rub.   No murmur heard.  Pulmonary/Chest: Effort normal and breath sounds normal. No stridor. No respiratory distress. He has no wheezes. He has no rales.   Abdominal: Soft. Bowel sounds are normal. He exhibits no distension and no mass. There is no tenderness. There is no guarding.   Lymphadenopathy:     He has no cervical adenopathy.   Neurological: He is alert and oriented to person, place, and time.   Skin: Skin is warm and dry.   Psychiatric: He has a normal mood and affect. His behavior is normal. Thought content normal.   Vitals reviewed.    Assessment:       1. HIV infection    2. Hypercholesterolemia          Plan:        1. Shingrix series to start today   2. Continue present meds   3. RTC 6 mos with usual labs

## 2019-09-16 RX ORDER — ZIDOVUDINE 300 MG/1
300 TABLET ORAL 2 TIMES DAILY
Qty: 60 TABLET | Refills: 0 | Status: SHIPPED | OUTPATIENT
Start: 2019-09-16 | End: 2019-10-18 | Stop reason: SDUPTHER

## 2019-10-18 RX ORDER — ZIDOVUDINE 300 MG/1
TABLET ORAL
Qty: 60 TABLET | Refills: 0 | Status: SHIPPED | OUTPATIENT
Start: 2019-10-18 | End: 2019-11-18 | Stop reason: SDUPTHER

## 2019-11-18 DIAGNOSIS — Z21 HIV POSITIVE: ICD-10-CM

## 2019-11-18 RX ORDER — ZIDOVUDINE 300 MG/1
TABLET ORAL
Qty: 60 TABLET | Refills: 11 | Status: SHIPPED | OUTPATIENT
Start: 2019-11-18 | End: 2020-10-26

## 2019-11-18 RX ORDER — TENOFOVIR DISOPROXIL FUMARATE 300 MG/1
TABLET, FILM COATED ORAL
Qty: 30 TABLET | Refills: 11 | Status: SHIPPED | OUTPATIENT
Start: 2019-11-18 | End: 2020-10-26

## 2019-12-03 ENCOUNTER — TELEPHONE (OUTPATIENT)
Dept: INFECTIOUS DISEASES | Facility: CLINIC | Age: 54
End: 2019-12-03

## 2019-12-03 DIAGNOSIS — B20 HUMAN IMMUNODEFICIENCY VIRUS (HIV) DISEASE: Primary | ICD-10-CM

## 2019-12-03 NOTE — TELEPHONE ENCOUNTER
Lab appt made for 12/13/19 at [facility] per patient request, tried calling patient to advise = no answer left voicemail informing appt was scheduled per his request and to call back if needed.

## 2019-12-03 NOTE — TELEPHONE ENCOUNTER
----- Message from Anahy Wilson MA sent at 11/29/2019  1:10 PM CST -----  Contact: pt   Pt needs lab orders  ----- Message -----  From: Tammy López  Sent: 11/29/2019  12:57 PM CST  To: Ester Beckham Staff    Pt is calling to speak with the nurse pt has an appt on 12/20/2019 and the pt needs to be scheduled for labs before his appt can you please call pt at [phone]. Pt is asking to be scheduled for his labs on 12/13/2019 the latest appt pt would like to be scheduled in [place]

## 2019-12-13 ENCOUNTER — LAB VISIT (OUTPATIENT)
Dept: LAB | Facility: HOSPITAL | Age: 54
End: 2019-12-13
Attending: INTERNAL MEDICINE
Payer: COMMERCIAL

## 2019-12-13 DIAGNOSIS — B20 HUMAN IMMUNODEFICIENCY VIRUS (HIV) DISEASE: ICD-10-CM

## 2019-12-13 LAB
ALBUMIN SERPL BCP-MCNC: 4.3 G/DL (ref 3.5–5.2)
ALP SERPL-CCNC: 80 U/L (ref 55–135)
ALT SERPL W/O P-5'-P-CCNC: 27 U/L (ref 10–44)
ANION GAP SERPL CALC-SCNC: 9 MMOL/L (ref 8–16)
AST SERPL-CCNC: 36 U/L (ref 10–40)
BASOPHILS # BLD AUTO: 0.03 K/UL (ref 0–0.2)
BASOPHILS NFR BLD: 0.5 % (ref 0–1.9)
BILIRUB SERPL-MCNC: 1.4 MG/DL (ref 0.1–1)
BUN SERPL-MCNC: 16 MG/DL (ref 6–20)
CALCIUM SERPL-MCNC: 9.4 MG/DL (ref 8.7–10.5)
CHLORIDE SERPL-SCNC: 104 MMOL/L (ref 95–110)
CHOLEST SERPL-MCNC: 244 MG/DL (ref 120–199)
CHOLEST/HDLC SERPL: 4.5 {RATIO} (ref 2–5)
CO2 SERPL-SCNC: 26 MMOL/L (ref 23–29)
CREAT SERPL-MCNC: 1 MG/DL (ref 0.5–1.4)
DIFFERENTIAL METHOD: ABNORMAL
EOSINOPHIL # BLD AUTO: 0.1 K/UL (ref 0–0.5)
EOSINOPHIL NFR BLD: 1.9 % (ref 0–8)
ERYTHROCYTE [DISTWIDTH] IN BLOOD BY AUTOMATED COUNT: 12.3 % (ref 11.5–14.5)
EST. GFR  (AFRICAN AMERICAN): >60 ML/MIN/1.73 M^2
EST. GFR  (NON AFRICAN AMERICAN): >60 ML/MIN/1.73 M^2
GLUCOSE SERPL-MCNC: 83 MG/DL (ref 70–110)
HCT VFR BLD AUTO: 44.2 % (ref 40–54)
HDLC SERPL-MCNC: 54 MG/DL (ref 40–75)
HDLC SERPL: 22.1 % (ref 20–50)
HGB BLD-MCNC: 15 G/DL (ref 14–18)
IMM GRANULOCYTES # BLD AUTO: 0.01 K/UL (ref 0–0.04)
IMM GRANULOCYTES NFR BLD AUTO: 0.2 % (ref 0–0.5)
LDLC SERPL CALC-MCNC: 172 MG/DL (ref 63–159)
LYMPHOCYTES # BLD AUTO: 2.5 K/UL (ref 1–4.8)
LYMPHOCYTES NFR BLD: 39.6 % (ref 18–48)
MCH RBC QN AUTO: 37.5 PG (ref 27–31)
MCHC RBC AUTO-ENTMCNC: 33.9 G/DL (ref 32–36)
MCV RBC AUTO: 111 FL (ref 82–98)
MONOCYTES # BLD AUTO: 0.6 K/UL (ref 0.3–1)
MONOCYTES NFR BLD: 9 % (ref 4–15)
NEUTROPHILS # BLD AUTO: 3.1 K/UL (ref 1.8–7.7)
NEUTROPHILS NFR BLD: 48.8 % (ref 38–73)
NONHDLC SERPL-MCNC: 190 MG/DL
NRBC BLD-RTO: 0 /100 WBC
PLATELET # BLD AUTO: 237 K/UL (ref 150–350)
PMV BLD AUTO: 9.8 FL (ref 9.2–12.9)
POTASSIUM SERPL-SCNC: 4.2 MMOL/L (ref 3.5–5.1)
PROT SERPL-MCNC: 7.5 G/DL (ref 6–8.4)
RBC # BLD AUTO: 4 M/UL (ref 4.6–6.2)
SODIUM SERPL-SCNC: 139 MMOL/L (ref 136–145)
TRIGL SERPL-MCNC: 90 MG/DL (ref 30–150)
WBC # BLD AUTO: 6.36 K/UL (ref 3.9–12.7)

## 2019-12-13 PROCEDURE — 80053 COMPREHEN METABOLIC PANEL: CPT

## 2019-12-13 PROCEDURE — 86592 SYPHILIS TEST NON-TREP QUAL: CPT

## 2019-12-13 PROCEDURE — 86780 TREPONEMA PALLIDUM: CPT

## 2019-12-13 PROCEDURE — 36415 COLL VENOUS BLD VENIPUNCTURE: CPT | Mod: PO

## 2019-12-13 PROCEDURE — 80061 LIPID PANEL: CPT

## 2019-12-13 PROCEDURE — 87536 HIV-1 QUANT&REVRSE TRNSCRPJ: CPT

## 2019-12-13 PROCEDURE — 86361 T CELL ABSOLUTE COUNT: CPT

## 2019-12-13 PROCEDURE — 85025 COMPLETE CBC W/AUTO DIFF WBC: CPT

## 2019-12-14 LAB — RPR SER QL: NORMAL

## 2019-12-16 LAB
CD3+CD4+ CELLS # BLD: 980 CELLS/UL (ref 300–1400)
CD3+CD4+ CELLS NFR BLD: 39.5 % (ref 28–57)
T PALLIDUM AB SER QL IF: NORMAL

## 2019-12-20 ENCOUNTER — LAB VISIT (OUTPATIENT)
Dept: LAB | Facility: HOSPITAL | Age: 54
End: 2019-12-20
Attending: INTERNAL MEDICINE
Payer: COMMERCIAL

## 2019-12-20 ENCOUNTER — OFFICE VISIT (OUTPATIENT)
Dept: INFECTIOUS DISEASES | Facility: CLINIC | Age: 54
End: 2019-12-20
Payer: COMMERCIAL

## 2019-12-20 VITALS
SYSTOLIC BLOOD PRESSURE: 116 MMHG | WEIGHT: 174.19 LBS | DIASTOLIC BLOOD PRESSURE: 73 MMHG | HEART RATE: 57 BPM | TEMPERATURE: 98 F | HEIGHT: 72 IN | BODY MASS INDEX: 23.59 KG/M2

## 2019-12-20 DIAGNOSIS — B20 HUMAN IMMUNODEFICIENCY VIRUS (HIV) DISEASE: Primary | ICD-10-CM

## 2019-12-20 DIAGNOSIS — B20 HUMAN IMMUNODEFICIENCY VIRUS (HIV) DISEASE: ICD-10-CM

## 2019-12-20 PROCEDURE — 36415 COLL VENOUS BLD VENIPUNCTURE: CPT

## 2019-12-20 PROCEDURE — 99215 OFFICE O/P EST HI 40 MIN: CPT | Mod: S$GLB,,, | Performed by: INTERNAL MEDICINE

## 2019-12-20 PROCEDURE — 99999 PR PBB SHADOW E&M-EST. PATIENT-LVL III: CPT | Mod: PBBFAC,,, | Performed by: INTERNAL MEDICINE

## 2019-12-20 PROCEDURE — 99999 PR PBB SHADOW E&M-EST. PATIENT-LVL III: ICD-10-PCS | Mod: PBBFAC,,, | Performed by: INTERNAL MEDICINE

## 2019-12-20 PROCEDURE — 87901 NFCT AGT GNTYP ALYS HIV1 REV: CPT

## 2019-12-20 PROCEDURE — 99215 PR OFFICE/OUTPT VISIT, EST, LEVL V, 40-54 MIN: ICD-10-PCS | Mod: S$GLB,,, | Performed by: INTERNAL MEDICINE

## 2019-12-20 PROCEDURE — 3008F PR BODY MASS INDEX (BMI) DOCUMENTED: ICD-10-PCS | Mod: CPTII,S$GLB,, | Performed by: INTERNAL MEDICINE

## 2019-12-20 PROCEDURE — 87906 NFCT AGT GNTYP ALYS HIV1: CPT

## 2019-12-20 PROCEDURE — 3008F BODY MASS INDEX DOCD: CPT | Mod: CPTII,S$GLB,, | Performed by: INTERNAL MEDICINE

## 2019-12-20 RX ORDER — AZITHROMYCIN 250 MG/1
TABLET, FILM COATED ORAL
COMMUNITY
Start: 2019-08-15 | End: 2023-06-29

## 2019-12-20 NOTE — PROGRESS NOTES
Subjective:      Patient ID: Oscar Gibbs is a 54 y.o. male.    Chief Complaint:Follow-up    History of Present Illness    53 y/o with HIV diagnosed in 2002.  He did a self test kit that was positive.  He is unsure of what HIV regimen he was on.  He has been on tenofovir, zidovudine and kaletra for several years now.  He denies diarrhea with this regimen.  He feels well today.  He has some deposits under both his eyes and has consulted a specialist for them.    Medical History  He will have colonoscopy in January    Family History  Brother had polyps  Brother had liver cancer  Father had CLL and lymphoma  Uncle had prostate cancer  Cousin had leukemia    Social History  Born and raised in [place], [place].  Works as a .  Quite tobacco in June, 2014.  A glass of wine per day.  No illicit drugs.   He has been in a committed monogamous relationship with his male partner.    Review of Systems   Constitution: Negative for chills, decreased appetite, fever, malaise/fatigue, night sweats, weight gain and weight loss.   HENT: Positive for tinnitus. Negative for congestion, ear pain, hearing loss, hoarse voice and sore throat.    Eyes: Positive for redness. Negative for blurred vision and visual disturbance.   Cardiovascular: Negative for chest pain, leg swelling and palpitations.   Respiratory: Negative for cough, hemoptysis, shortness of breath and sputum production.    Hematologic/Lymphatic: Negative for adenopathy. Does not bruise/bleed easily.   Skin: Negative for dry skin, itching, rash and suspicious lesions.   Musculoskeletal: Negative for back pain, joint pain, myalgias and neck pain.   Gastrointestinal: Negative for abdominal pain, constipation, diarrhea, heartburn, nausea and vomiting.   Genitourinary: Negative for dysuria, flank pain, frequency, hematuria, hesitancy and urgency.   Neurological: Negative for dizziness, headaches, numbness, paresthesias and weakness.   Psychiatric/Behavioral:  Negative for depression and memory loss. The patient does not have insomnia and is not nervous/anxious.      Objective:   Physical Exam   Constitutional: He is oriented to person, place, and time. He appears well-developed and well-nourished. No distress.   HENT:   Head: Normocephalic and atraumatic.   Right Ear: External ear normal.   Left Ear: External ear normal.   Nose: Nose normal.   Mouth/Throat: Oropharynx is clear and moist. No oropharyngeal exudate.   Yellowish nodular area under both eyes.   Eyes: Pupils are equal, round, and reactive to light. Conjunctivae and EOM are normal. Right eye exhibits no discharge. Left eye exhibits no discharge. No scleral icterus.   Neck: Normal range of motion. Neck supple. No JVD present. No tracheal deviation present. No thyromegaly present.   Cardiovascular: Normal rate, regular rhythm, normal heart sounds and intact distal pulses. Exam reveals no gallop and no friction rub.   No murmur heard.  Pulmonary/Chest: Effort normal and breath sounds normal. No stridor. No respiratory distress. He has no wheezes. He has no rales. He exhibits no tenderness.   Abdominal: Soft. Bowel sounds are normal. He exhibits no distension and no mass. There is no tenderness. There is no rebound and no guarding.   Musculoskeletal: Normal range of motion. He exhibits no edema or tenderness.   Lymphadenopathy:     He has no cervical adenopathy.   Neurological: He is alert and oriented to person, place, and time. He has normal reflexes. He displays normal reflexes. No cranial nerve deficit. He exhibits normal muscle tone. Coordination normal.   Skin: Skin is warm. No rash noted. He is not diaphoretic. No erythema. No pallor.   Psychiatric: He has a normal mood and affect. His behavior is normal. Judgment and thought content normal.   Nursing note and vitals reviewed.    CD4 % [place] T Cell   Date Value Ref Range Status   12/13/2019 39.5 28 - 57 % Final   04/13/2019 39.0 28 - 57 % Final   08/04/2018  39.1 28 - 57 % Final     Absolute CD4   Date Value Ref Range Status   12/13/2019 980.0 300 - 1400 cells/ul Final     Comment:     This test was developed and its performance characteristics   determined by [facility].    It has not been cleared or approved by the U.S. Food and Drug   Administration. The FDA has determined that such clearance or   approval is not necessary.  This test is used for clinical purposes.    It should not be regarded as investigational or for research.  This   laboratory is certified under CLIA-88 as qualified to perform high   complexity clinical laboratory testing.     04/13/2019 1186 300 - 1400 cells/ul Final     Comment:     This test was developed and its performance characteristics   determined by [facility].    It has not been cleared or approved by the U.S. Food and Drug   Administration. The FDA has determined that such clearance or   approval is not necessary.  This test is used for clinical purposes.    It should not be regarded as investigational or for research.  This   laboratory is certified under CLIA-88 as qualified to perform high   complexity clinical laboratory testing.     08/04/2018 997 300 - 1400 cells/ul Final     Comment:     This test was developed and its performance characteristics   determined by [facility].    It has not been cleared or approved by the U.S. Food and Drug   Administration. The FDA has determined that such clearance or   approval is not necessary.  This test is used for clinical purposes.    It should not be regarded as investigational or for research.  This   laboratory is certified under CLIA-88 as qualified to perform high   complexity clinical laboratory testing.       HIV-1 RNA   Date Value Ref Range Status   12/15/2017 Not detected Not detected Final   06/10/2017 Not detected Not detected Final   12/03/2016 Not detected  Not detected Final     HIV 1 RNA Ultra   Date Value Ref Range Status   12/13/2019 <40 <40 Copies/mL Final   04/13/2019 <40 <40 Copies/mL Final   08/04/2018 <40 <40 Copies/mL Final         Assessment:       1. Human immunodeficiency virus (HIV) disease        53 y/o with a history of HIV currently on tenofovir, zidovidine and kaletra.  He has been virologically suppressed with a good CD4 count for several years.  He is establishing care with me.  He is on an older regimen.  I discussed the side effects of these drugs with him.  His labs show hyperlipidemia which could be related to kaletra.  I also discussed the risk of kidney and bone pathology with tenofovir.  Stavudine has been associated with some peripheral neuropathy and lipodystrophy.  He is interested in switching to a simpler regimen with less side effects.  I don't have access to a genotype.  I will check a genosure today.  I will follow up on this result and discuss drug modification with him.  Plan:       Human immunodeficiency virus (HIV) disease  -     GENOSURE ARCHIVE DNA SEQUENCING; Future; Expected date: 12/20/2019

## 2019-12-24 ENCOUNTER — TELEPHONE (OUTPATIENT)
Dept: INFECTIOUS DISEASES | Facility: CLINIC | Age: 54
End: 2019-12-24

## 2019-12-24 DIAGNOSIS — B20 HUMAN IMMUNODEFICIENCY VIRUS (HIV) DISEASE: Primary | ICD-10-CM

## 2019-12-24 NOTE — TELEPHONE ENCOUNTER
Spoke with Dayana in the send out lab who told me that the pt specimen was cancelled by the [facility] lab, says she spoke with someone named Katie there. I then spoke with Addis the send out lab supervisor and she stated that the specimen was cancelled due to the specimen being sent at the wrong temperature by some named Diana in their lab.

## 2019-12-24 NOTE — TELEPHONE ENCOUNTER
----- Message from Sheryl Hathaway sent at 12/24/2019  5:21 AM CST -----    There was an issue with a test ordered on this patient from 12/20/2019. Please call the Sendout lab as soon as possible at [phone] ext. [phone] for complete details.  Anyone in the Sendout lab will be able to assist you with further information.

## 2019-12-24 NOTE — TELEPHONE ENCOUNTER
----- Message from Anahy Wilson MA sent at 12/24/2019  9:40 AM CST -----  I spoke with Addis in the send out lab and was tols that the pt General Archive Sequence was shipped out from there by someone named Diana at the wrong temperature. I will document this but I know we will have to call the pt to get the test done again  ----- Message -----  From: Sheryl Hathaway  Sent: 12/24/2019   5:21 AM CST  To: Ester Beckham Staff      There was an issue with a test ordered on this patient from 12/20/2019. Please call the Sendout lab as soon as possible at [phone] ext. [phone] for complete details.  Anyone in the Sendout lab will be able to assist you with further information.

## 2019-12-26 ENCOUNTER — PATIENT MESSAGE (OUTPATIENT)
Dept: INFECTIOUS DISEASES | Facility: CLINIC | Age: 54
End: 2019-12-26

## 2020-01-18 DIAGNOSIS — Z21 HIV POSITIVE: ICD-10-CM

## 2020-01-22 RX ORDER — LOPINAVIR AND RITONAVIR 200; 50 MG/1; MG/1
TABLET, FILM COATED ORAL
Qty: 120 TABLET | Refills: 11 | OUTPATIENT
Start: 2020-01-22

## 2020-01-29 DIAGNOSIS — Z21 HIV POSITIVE: ICD-10-CM

## 2020-01-30 RX ORDER — LOPINAVIR AND RITONAVIR 200; 50 MG/1; MG/1
TABLET, FILM COATED ORAL
Qty: 120 TABLET | Refills: 11 | Status: SHIPPED | OUTPATIENT
Start: 2020-01-30 | End: 2020-10-26

## 2020-07-21 ENCOUNTER — TELEPHONE (OUTPATIENT)
Dept: INFECTIOUS DISEASES | Facility: CLINIC | Age: 55
End: 2020-07-21

## 2020-07-21 DIAGNOSIS — B20 HUMAN IMMUNODEFICIENCY VIRUS (HIV) DISEASE: Primary | ICD-10-CM

## 2020-07-21 NOTE — TELEPHONE ENCOUNTER
----- Message from Anahy Wilson MA sent at 7/20/2020  4:42 PM CDT -----  Regarding: FW: PT  Contact: PT  Pt needs lab orders   ----- Message -----  From: Saniya Carvajal  Sent: 7/20/2020   4:40 PM CDT  To: Ester Beckham Staff  Subject: PT                                               PT called to make a f/u appointment bc he hasn't seen the doctor since December and I was able to get him an appointment on 8/6 and he wanted to also schedule his blood work but I didn't know what labs needed to be scheduled and he didn't know the specific names. Please call back to schedule labs    Callback: [phone]

## 2020-08-01 ENCOUNTER — LAB VISIT (OUTPATIENT)
Dept: LAB | Facility: HOSPITAL | Age: 55
End: 2020-08-01
Attending: INTERNAL MEDICINE
Payer: COMMERCIAL

## 2020-08-01 DIAGNOSIS — B20 HUMAN IMMUNODEFICIENCY VIRUS (HIV) DISEASE: ICD-10-CM

## 2020-08-01 LAB
ALBUMIN SERPL BCP-MCNC: 4.2 G/DL (ref 3.5–5.2)
ALP SERPL-CCNC: 79 U/L (ref 55–135)
ALT SERPL W/O P-5'-P-CCNC: 20 U/L (ref 10–44)
ANION GAP SERPL CALC-SCNC: 7 MMOL/L (ref 8–16)
AST SERPL-CCNC: 26 U/L (ref 10–40)
BASOPHILS # BLD AUTO: 0.03 K/UL (ref 0–0.2)
BASOPHILS NFR BLD: 0.5 % (ref 0–1.9)
BILIRUB SERPL-MCNC: 0.8 MG/DL (ref 0.1–1)
BUN SERPL-MCNC: 12 MG/DL (ref 6–20)
CALCIUM SERPL-MCNC: 9.4 MG/DL (ref 8.7–10.5)
CHLORIDE SERPL-SCNC: 107 MMOL/L (ref 95–110)
CO2 SERPL-SCNC: 26 MMOL/L (ref 23–29)
CREAT SERPL-MCNC: 1 MG/DL (ref 0.5–1.4)
DIFFERENTIAL METHOD: ABNORMAL
EOSINOPHIL # BLD AUTO: 0.2 K/UL (ref 0–0.5)
EOSINOPHIL NFR BLD: 3.4 % (ref 0–8)
ERYTHROCYTE [DISTWIDTH] IN BLOOD BY AUTOMATED COUNT: 12.8 % (ref 11.5–14.5)
EST. GFR  (AFRICAN AMERICAN): >60 ML/MIN/1.73 M^2
EST. GFR  (NON AFRICAN AMERICAN): >60 ML/MIN/1.73 M^2
GLUCOSE SERPL-MCNC: 93 MG/DL (ref 70–110)
HCT VFR BLD AUTO: 44.1 % (ref 40–54)
HGB BLD-MCNC: 15.2 G/DL (ref 14–18)
IMM GRANULOCYTES # BLD AUTO: 0.01 K/UL (ref 0–0.04)
IMM GRANULOCYTES NFR BLD AUTO: 0.2 % (ref 0–0.5)
LYMPHOCYTES # BLD AUTO: 2.4 K/UL (ref 1–4.8)
LYMPHOCYTES NFR BLD: 42.4 % (ref 18–48)
MCH RBC QN AUTO: 38.1 PG (ref 27–31)
MCHC RBC AUTO-ENTMCNC: 34.5 G/DL (ref 32–36)
MCV RBC AUTO: 111 FL (ref 82–98)
MONOCYTES # BLD AUTO: 0.5 K/UL (ref 0.3–1)
MONOCYTES NFR BLD: 8.2 % (ref 4–15)
NEUTROPHILS # BLD AUTO: 2.5 K/UL (ref 1.8–7.7)
NEUTROPHILS NFR BLD: 45.3 % (ref 38–73)
NRBC BLD-RTO: 0 /100 WBC
PLATELET # BLD AUTO: 212 K/UL (ref 150–350)
PMV BLD AUTO: 9.8 FL (ref 9.2–12.9)
POTASSIUM SERPL-SCNC: 4.5 MMOL/L (ref 3.5–5.1)
PROT SERPL-MCNC: 7.4 G/DL (ref 6–8.4)
RBC # BLD AUTO: 3.99 M/UL (ref 4.6–6.2)
SODIUM SERPL-SCNC: 140 MMOL/L (ref 136–145)
WBC # BLD AUTO: 5.59 K/UL (ref 3.9–12.7)

## 2020-08-01 PROCEDURE — 36415 COLL VENOUS BLD VENIPUNCTURE: CPT | Mod: PO

## 2020-08-01 PROCEDURE — 80053 COMPREHEN METABOLIC PANEL: CPT

## 2020-08-01 PROCEDURE — 85025 COMPLETE CBC W/AUTO DIFF WBC: CPT

## 2020-08-01 PROCEDURE — 87906 NFCT AGT GNTYP ALYS HIV1: CPT

## 2020-08-01 PROCEDURE — 87900 PHENOTYPE INFECT AGENT DRUG: CPT

## 2020-08-01 PROCEDURE — 86361 T CELL ABSOLUTE COUNT: CPT

## 2020-08-03 LAB
CD3+CD4+ CELLS # BLD: 922 CELLS/UL (ref 300–1400)
CD3+CD4+ CELLS NFR BLD: 37.7 % (ref 28–57)

## 2020-08-06 ENCOUNTER — OFFICE VISIT (OUTPATIENT)
Dept: INFECTIOUS DISEASES | Facility: CLINIC | Age: 55
End: 2020-08-06
Payer: COMMERCIAL

## 2020-08-06 DIAGNOSIS — E78.2 MIXED HYPERLIPIDEMIA: ICD-10-CM

## 2020-08-06 DIAGNOSIS — B20 HUMAN IMMUNODEFICIENCY VIRUS (HIV) DISEASE: Primary | ICD-10-CM

## 2020-08-06 PROCEDURE — 99214 OFFICE O/P EST MOD 30 MIN: CPT | Mod: 95,,, | Performed by: INTERNAL MEDICINE

## 2020-08-06 PROCEDURE — 99214 PR OFFICE/OUTPT VISIT, EST, LEVL IV, 30-39 MIN: ICD-10-PCS | Mod: 95,,, | Performed by: INTERNAL MEDICINE

## 2020-08-19 LAB — GENOSURE ARCHIVE DNA SEQUENCING: NORMAL

## 2020-08-22 NOTE — PROGRESS NOTES
Subjective:      Patient ID: Oscar Gibbs is a 55 y.o. male.    Chief Complaint:No chief complaint on file.    The patient location is: home  The chief complaint leading to consultation is: hiv follow up    Visit type: audiovisual    Face to Face time with patient: 20 minutes of total time spent on the encounter, which includes face to face time and non-face to face time preparing to see the patient (eg, review of tests), Obtaining and/or reviewing separately obtained history, Documenting clinical information in the electronic or other health record, Independently interpreting results (not separately reported) and communicating results to the patient/family/caregiver, or Care coordination (not separately reported).         Each patient to whom he or she provides medical services by telemedicine is:  (1) informed of the relationship between the physician and patient and the respective role of any other health care provider with respect to management of the patient; and (2) notified that he or she may decline to receive medical services by telemedicine and may withdraw from such care at any time.    Notes:       History of Present Illness    54 y/o male with a history of HIV.  He is currently on tenofovir, zidovudine and kaletra.  He has been tolerating this regimen.  He is here today for follow up.    Review of Systems   All other systems reviewed and are negative.    CD4 % [place] T Cell   Date Value Ref Range Status   08/01/2020 37.7 28 - 57 % Final   12/13/2019 39.5 28 - 57 % Final   04/13/2019 39.0 28 - 57 % Final     Absolute CD4   Date Value Ref Range Status   08/01/2020 922.0 300 - 1400 cells/ul Final     Comment:     This test was developed and its performance characteristics   determined by [facility].    It has not been cleared or approved by the U.S. Food and Drug   Administration. The FDA has determined that such clearance or   approval is not necessary.   This test is used for clinical purposes.    It should not be regarded as investigational or for research.  This   laboratory is certified under CLIA-88 as qualified to perform high   complexity clinical laboratory testing.     12/13/2019 980.0 300 - 1400 cells/ul Final     Comment:     This test was developed and its performance characteristics   determined by [facility].    It has not been cleared or approved by the U.S. Food and Drug   Administration. The FDA has determined that such clearance or   approval is not necessary.  This test is used for clinical purposes.    It should not be regarded as investigational or for research.  This   laboratory is certified under CLIA-88 as qualified to perform high   complexity clinical laboratory testing.     04/13/2019 1186 300 - 1400 cells/ul Final     Comment:     This test was developed and its performance characteristics   determined by [facility].    It has not been cleared or approved by the U.S. Food and Drug   Administration. The FDA has determined that such clearance or   approval is not necessary.  This test is used for clinical purposes.    It should not be regarded as investigational or for research.  This   laboratory is certified under CLIA-88 as qualified to perform high   complexity clinical laboratory testing.       HIV-1 RNA   Date Value Ref Range Status   12/15/2017 Not detected Not detected Final   06/10/2017 Not detected Not detected Final   12/03/2016 Not detected Not detected Final     HIV 1 RNA Ultra   Date Value Ref Range Status   12/13/2019 <40 <40 Copies/mL Final   04/13/2019 <40 <40 Copies/mL Final   08/04/2018 <40 <40 Copies/mL Final         Objective:   Physical Exam  Assessment:       1. Human immunodeficiency virus (HIV) disease    2. Mixed hyperlipidemia        56 y/o male with a history of HIV.  He is currently on tenofovir, zidovudine and kaletra.  He has  been tolerating this regimen.  He is here today for follow up.  CD4 count is 922.  He is interested in simplifying his regimen.  Genosure is pending.  Will follow up on results.  As he is on kaletra, and his last LDL was 172.  I will check fasting lipids as well.  Plan:       Mixed hyperlipidemia  -     Lipid Panel; Future; Expected date: 08/06/2020    Human immunodeficiency virus (HIV) disease  -     HIV RNA, quantitative, PCR; Future; Expected date: 08/06/2020

## 2020-09-23 ENCOUNTER — PATIENT MESSAGE (OUTPATIENT)
Dept: INFECTIOUS DISEASES | Facility: CLINIC | Age: 55
End: 2020-09-23

## 2020-10-21 ENCOUNTER — PATIENT MESSAGE (OUTPATIENT)
Dept: INFECTIOUS DISEASES | Facility: CLINIC | Age: 55
End: 2020-10-21

## 2020-10-26 ENCOUNTER — TELEPHONE (OUTPATIENT)
Dept: INFECTIOUS DISEASES | Facility: CLINIC | Age: 55
End: 2020-10-26

## 2020-10-26 DIAGNOSIS — B20 HUMAN IMMUNODEFICIENCY VIRUS (HIV) DISEASE: Primary | ICD-10-CM

## 2020-10-26 RX ORDER — DOLUTEGRAVIR SODIUM AND RILPIVIRINE HYDROCHLORIDE 50; 25 MG/1; MG/1
1 TABLET, FILM COATED ORAL DAILY
Qty: 30 TABLET | Refills: 5 | Status: SHIPPED | OUTPATIENT
Start: 2020-10-26 | End: 2020-11-25

## 2020-10-27 ENCOUNTER — PATIENT MESSAGE (OUTPATIENT)
Dept: INFECTIOUS DISEASES | Facility: CLINIC | Age: 55
End: 2020-10-27

## 2020-10-30 ENCOUNTER — TELEPHONE (OUTPATIENT)
Dept: INFECTIOUS DISEASES | Facility: CLINIC | Age: 55
End: 2020-10-30

## 2020-10-30 NOTE — TELEPHONE ENCOUNTER
Please call his [facility] pharmacy and find out what paperwork we need to fill out to get his medication covered. Thanks.

## 2020-11-04 ENCOUNTER — PATIENT MESSAGE (OUTPATIENT)
Dept: INFECTIOUS DISEASES | Facility: CLINIC | Age: 55
End: 2020-11-04

## 2020-11-24 ENCOUNTER — PATIENT MESSAGE (OUTPATIENT)
Dept: INFECTIOUS DISEASES | Facility: CLINIC | Age: 55
End: 2020-11-24

## 2020-11-25 ENCOUNTER — TELEPHONE (OUTPATIENT)
Dept: INFECTIOUS DISEASES | Facility: CLINIC | Age: 55
End: 2020-11-25

## 2020-11-25 DIAGNOSIS — B20 HUMAN IMMUNODEFICIENCY VIRUS (HIV) DISEASE: ICD-10-CM

## 2020-11-25 DIAGNOSIS — B20 HUMAN IMMUNODEFICIENCY VIRUS (HIV) DISEASE: Primary | ICD-10-CM

## 2020-11-25 RX ORDER — ZIDOVUDINE 300 MG/1
300 TABLET ORAL 2 TIMES DAILY
Qty: 60 TABLET | Refills: 5 | Status: SHIPPED | OUTPATIENT
Start: 2020-11-25 | End: 2021-03-19 | Stop reason: SDUPTHER

## 2020-11-25 RX ORDER — TENOFOVIR DISOPROXIL FUMARATE 300 MG/1
300 TABLET, FILM COATED ORAL DAILY
Qty: 30 TABLET | Refills: 5 | Status: SHIPPED | OUTPATIENT
Start: 2020-11-25 | End: 2021-03-19 | Stop reason: SDUPTHER

## 2020-11-25 RX ORDER — LOPINAVIR AND RITONAVIR 200; 50 MG/1; MG/1
2 TABLET, FILM COATED ORAL 2 TIMES DAILY
Qty: 120 TABLET | Refills: 5 | Status: SHIPPED | OUTPATIENT
Start: 2020-11-25 | End: 2021-03-19 | Stop reason: SDUPTHER

## 2020-11-27 RX ORDER — TENOFOVIR DISOPROXIL FUMARATE 300 MG/1
300 TABLET, FILM COATED ORAL DAILY
Qty: 30 TABLET | Refills: 5 | OUTPATIENT
Start: 2020-11-27 | End: 2021-05-26

## 2020-11-27 RX ORDER — ZIDOVUDINE 300 MG/1
300 TABLET ORAL 2 TIMES DAILY
Qty: 60 TABLET | Refills: 5 | OUTPATIENT
Start: 2020-11-27 | End: 2021-05-26

## 2020-12-04 ENCOUNTER — TELEPHONE (OUTPATIENT)
Dept: INFECTIOUS DISEASES | Facility: CLINIC | Age: 55
End: 2020-12-04

## 2020-12-04 ENCOUNTER — PATIENT MESSAGE (OUTPATIENT)
Dept: INFECTIOUS DISEASES | Facility: CLINIC | Age: 55
End: 2020-12-04

## 2021-02-02 ENCOUNTER — TELEPHONE (OUTPATIENT)
Dept: INFECTIOUS DISEASES | Facility: CLINIC | Age: 56
End: 2021-02-02

## 2021-02-02 ENCOUNTER — PATIENT MESSAGE (OUTPATIENT)
Dept: INFECTIOUS DISEASES | Facility: CLINIC | Age: 56
End: 2021-02-02

## 2021-02-22 ENCOUNTER — PATIENT MESSAGE (OUTPATIENT)
Dept: INFECTIOUS DISEASES | Facility: CLINIC | Age: 56
End: 2021-02-22

## 2021-02-23 ENCOUNTER — TELEPHONE (OUTPATIENT)
Dept: INFECTIOUS DISEASES | Facility: CLINIC | Age: 56
End: 2021-02-23

## 2021-02-23 ENCOUNTER — PATIENT MESSAGE (OUTPATIENT)
Dept: INFECTIOUS DISEASES | Facility: CLINIC | Age: 56
End: 2021-02-23

## 2021-03-13 ENCOUNTER — LAB VISIT (OUTPATIENT)
Dept: LAB | Facility: HOSPITAL | Age: 56
End: 2021-03-13
Attending: INTERNAL MEDICINE
Payer: COMMERCIAL

## 2021-03-13 DIAGNOSIS — B20 HUMAN IMMUNODEFICIENCY VIRUS (HIV) DISEASE: ICD-10-CM

## 2021-03-13 DIAGNOSIS — E78.2 MIXED HYPERLIPIDEMIA: ICD-10-CM

## 2021-03-13 LAB
CHOLEST SERPL-MCNC: 228 MG/DL (ref 120–199)
CHOLEST/HDLC SERPL: 4.2 {RATIO} (ref 2–5)
HDLC SERPL-MCNC: 54 MG/DL (ref 40–75)
HDLC SERPL: 23.7 % (ref 20–50)
LDLC SERPL CALC-MCNC: 156 MG/DL (ref 63–159)
NONHDLC SERPL-MCNC: 174 MG/DL
TRIGL SERPL-MCNC: 90 MG/DL (ref 30–150)

## 2021-03-13 PROCEDURE — 86706 HEP B SURFACE ANTIBODY: CPT | Performed by: INTERNAL MEDICINE

## 2021-03-13 PROCEDURE — 86704 HEP B CORE ANTIBODY TOTAL: CPT | Performed by: INTERNAL MEDICINE

## 2021-03-13 PROCEDURE — 86790 VIRUS ANTIBODY NOS: CPT | Performed by: INTERNAL MEDICINE

## 2021-03-13 PROCEDURE — 87536 HIV-1 QUANT&REVRSE TRNSCRPJ: CPT | Performed by: INTERNAL MEDICINE

## 2021-03-13 PROCEDURE — 80061 LIPID PANEL: CPT | Performed by: INTERNAL MEDICINE

## 2021-03-13 PROCEDURE — 86803 HEPATITIS C AB TEST: CPT | Performed by: INTERNAL MEDICINE

## 2021-03-13 PROCEDURE — 87340 HEPATITIS B SURFACE AG IA: CPT | Performed by: INTERNAL MEDICINE

## 2021-03-13 PROCEDURE — 86361 T CELL ABSOLUTE COUNT: CPT | Performed by: INTERNAL MEDICINE

## 2021-03-15 LAB
CD3+CD4+ CELLS # BLD: 959 CELLS/UL (ref 300–1400)
CD3+CD4+ CELLS NFR BLD: 41.4 % (ref 28–57)
HBV CORE AB SERPL QL IA: NEGATIVE
HBV SURFACE AB SER-ACNC: NEGATIVE M[IU]/ML
HBV SURFACE AG SERPL QL IA: NEGATIVE
HCV AB SERPL QL IA: NEGATIVE
HEPATITIS A ANTIBODY, IGG: NEGATIVE

## 2021-03-18 LAB
HIV UQ DATE RECEIVED: NORMAL
HIV UQ DATE RECEIVED: NORMAL
HIV UQ DATE REPORTED: NORMAL
HIV UQ DATE REPORTED: NORMAL
HIV1 RNA # SERPL NAA+PROBE: <40 COPIES/ML
HIV1 RNA # SERPL NAA+PROBE: <40 COPIES/ML
HIV1 RNA SERPL NAA+PROBE-LOG#: <1.6 LOG (10) COPIES/ML
HIV1 RNA SERPL NAA+PROBE-LOG#: <1.6 LOG (10) COPIES/ML
HIV1 RNA SERPL QL NAA+PROBE: NOT DETECTED
HIV1 RNA SERPL QL NAA+PROBE: NOT DETECTED

## 2021-03-19 ENCOUNTER — OFFICE VISIT (OUTPATIENT)
Dept: INFECTIOUS DISEASES | Facility: CLINIC | Age: 56
End: 2021-03-19
Payer: COMMERCIAL

## 2021-03-19 VITALS
TEMPERATURE: 98 F | WEIGHT: 171.5 LBS | HEIGHT: 72 IN | HEART RATE: 60 BPM | BODY MASS INDEX: 23.23 KG/M2 | SYSTOLIC BLOOD PRESSURE: 111 MMHG | DIASTOLIC BLOOD PRESSURE: 65 MMHG

## 2021-03-19 DIAGNOSIS — Z23 IMMUNIZATION DUE: ICD-10-CM

## 2021-03-19 DIAGNOSIS — B20 HUMAN IMMUNODEFICIENCY VIRUS (HIV) DISEASE: Primary | ICD-10-CM

## 2021-03-19 PROCEDURE — 3008F PR BODY MASS INDEX (BMI) DOCUMENTED: ICD-10-PCS | Mod: CPTII,S$GLB,, | Performed by: INTERNAL MEDICINE

## 2021-03-19 PROCEDURE — 1126F PR PAIN SEVERITY QUANTIFIED, NO PAIN PRESENT: ICD-10-PCS | Mod: S$GLB,,, | Performed by: INTERNAL MEDICINE

## 2021-03-19 PROCEDURE — 99214 OFFICE O/P EST MOD 30 MIN: CPT | Mod: S$GLB,,, | Performed by: INTERNAL MEDICINE

## 2021-03-19 PROCEDURE — 99999 PR PBB SHADOW E&M-EST. PATIENT-LVL III: ICD-10-PCS | Mod: PBBFAC,,, | Performed by: INTERNAL MEDICINE

## 2021-03-19 PROCEDURE — 99214 PR OFFICE/OUTPT VISIT, EST, LEVL IV, 30-39 MIN: ICD-10-PCS | Mod: S$GLB,,, | Performed by: INTERNAL MEDICINE

## 2021-03-19 PROCEDURE — 99999 PR PBB SHADOW E&M-EST. PATIENT-LVL III: CPT | Mod: PBBFAC,,, | Performed by: INTERNAL MEDICINE

## 2021-03-19 PROCEDURE — 1126F AMNT PAIN NOTED NONE PRSNT: CPT | Mod: S$GLB,,, | Performed by: INTERNAL MEDICINE

## 2021-03-19 PROCEDURE — 3008F BODY MASS INDEX DOCD: CPT | Mod: CPTII,S$GLB,, | Performed by: INTERNAL MEDICINE

## 2021-03-19 RX ORDER — LOPINAVIR AND RITONAVIR 200; 50 MG/1; MG/1
2 TABLET, FILM COATED ORAL 2 TIMES DAILY
Qty: 360 TABLET | Refills: 1 | Status: SHIPPED | OUTPATIENT
Start: 2021-03-19 | End: 2021-10-06

## 2021-03-19 RX ORDER — HEPATITIS B VACCINE (RECOMBINANT) 10 UG/ML
INJECTION, SUSPENSION INTRAMUSCULAR; SUBCUTANEOUS
Qty: 1 ML | Refills: 2 | Status: SHIPPED | OUTPATIENT
Start: 2021-03-19

## 2021-03-19 RX ORDER — ZIDOVUDINE 300 MG/1
300 TABLET ORAL 2 TIMES DAILY
Qty: 180 TABLET | Refills: 1 | Status: SHIPPED | OUTPATIENT
Start: 2021-03-19 | End: 2021-04-14

## 2021-03-19 RX ORDER — TENOFOVIR DISOPROXIL FUMARATE 300 MG/1
300 TABLET, FILM COATED ORAL DAILY
Qty: 90 TABLET | Refills: 1 | Status: SHIPPED | OUTPATIENT
Start: 2021-03-19 | End: 2021-06-22

## 2021-06-21 DIAGNOSIS — B20 HUMAN IMMUNODEFICIENCY VIRUS (HIV) DISEASE: ICD-10-CM

## 2021-06-22 RX ORDER — TENOFOVIR DISOPROXIL FUMARATE 300 MG/1
TABLET, FILM COATED ORAL
Qty: 30 TABLET | Refills: 5 | Status: SHIPPED | OUTPATIENT
Start: 2021-06-22 | End: 2021-10-06

## 2021-08-17 ENCOUNTER — TELEPHONE (OUTPATIENT)
Dept: INFECTIOUS DISEASES | Facility: CLINIC | Age: 56
End: 2021-08-17

## 2021-08-17 DIAGNOSIS — B20 HUMAN IMMUNODEFICIENCY VIRUS (HIV) DISEASE: Primary | ICD-10-CM

## 2021-08-18 ENCOUNTER — PATIENT MESSAGE (OUTPATIENT)
Dept: INFECTIOUS DISEASES | Facility: CLINIC | Age: 56
End: 2021-08-18

## 2021-08-18 ENCOUNTER — TELEPHONE (OUTPATIENT)
Dept: INFECTIOUS DISEASES | Facility: CLINIC | Age: 56
End: 2021-08-18

## 2021-08-18 DIAGNOSIS — Z00.00 HEALTH MAINTENANCE EXAMINATION: Primary | ICD-10-CM

## 2021-08-28 ENCOUNTER — LAB VISIT (OUTPATIENT)
Dept: LAB | Facility: HOSPITAL | Age: 56
End: 2021-08-28
Attending: INTERNAL MEDICINE
Payer: COMMERCIAL

## 2021-08-28 DIAGNOSIS — Z00.00 HEALTH MAINTENANCE EXAMINATION: ICD-10-CM

## 2021-08-28 DIAGNOSIS — B20 HUMAN IMMUNODEFICIENCY VIRUS (HIV) DISEASE: ICD-10-CM

## 2021-08-28 LAB
ALBUMIN SERPL BCP-MCNC: 4.3 G/DL (ref 3.5–5.2)
ALP SERPL-CCNC: 86 U/L (ref 55–135)
ALT SERPL W/O P-5'-P-CCNC: 29 U/L (ref 10–44)
ANION GAP SERPL CALC-SCNC: 11 MMOL/L (ref 8–16)
AST SERPL-CCNC: 29 U/L (ref 10–40)
BASOPHILS # BLD AUTO: 0.04 K/UL (ref 0–0.2)
BASOPHILS NFR BLD: 0.5 % (ref 0–1.9)
BILIRUB SERPL-MCNC: 1.1 MG/DL (ref 0.1–1)
BUN SERPL-MCNC: 15 MG/DL (ref 6–20)
CALCIUM SERPL-MCNC: 9.7 MG/DL (ref 8.7–10.5)
CHLORIDE SERPL-SCNC: 105 MMOL/L (ref 95–110)
CHOLEST SERPL-MCNC: 219 MG/DL (ref 120–199)
CHOLEST/HDLC SERPL: 4.1 {RATIO} (ref 2–5)
CO2 SERPL-SCNC: 24 MMOL/L (ref 23–29)
COMPLEXED PSA SERPL-MCNC: 0.3 NG/ML (ref 0–4)
CREAT SERPL-MCNC: 1 MG/DL (ref 0.5–1.4)
DIFFERENTIAL METHOD: ABNORMAL
EOSINOPHIL # BLD AUTO: 0.2 K/UL (ref 0–0.5)
EOSINOPHIL NFR BLD: 2.3 % (ref 0–8)
ERYTHROCYTE [DISTWIDTH] IN BLOOD BY AUTOMATED COUNT: 12.5 % (ref 11.5–14.5)
EST. GFR  (AFRICAN AMERICAN): >60 ML/MIN/1.73 M^2
EST. GFR  (NON AFRICAN AMERICAN): >60 ML/MIN/1.73 M^2
GLUCOSE SERPL-MCNC: 98 MG/DL (ref 70–110)
HCT VFR BLD AUTO: 44.7 % (ref 40–54)
HDLC SERPL-MCNC: 53 MG/DL (ref 40–75)
HDLC SERPL: 24.2 % (ref 20–50)
HGB BLD-MCNC: 15.6 G/DL (ref 14–18)
IMM GRANULOCYTES # BLD AUTO: 0.01 K/UL (ref 0–0.04)
IMM GRANULOCYTES NFR BLD AUTO: 0.1 % (ref 0–0.5)
LDLC SERPL CALC-MCNC: 145.8 MG/DL (ref 63–159)
LYMPHOCYTES # BLD AUTO: 2.3 K/UL (ref 1–4.8)
LYMPHOCYTES NFR BLD: 31.7 % (ref 18–48)
MCH RBC QN AUTO: 37.6 PG (ref 27–31)
MCHC RBC AUTO-ENTMCNC: 34.9 G/DL (ref 32–36)
MCV RBC AUTO: 108 FL (ref 82–98)
MONOCYTES # BLD AUTO: 0.7 K/UL (ref 0.3–1)
MONOCYTES NFR BLD: 9.4 % (ref 4–15)
NEUTROPHILS # BLD AUTO: 4.1 K/UL (ref 1.8–7.7)
NEUTROPHILS NFR BLD: 56 % (ref 38–73)
NONHDLC SERPL-MCNC: 166 MG/DL
NRBC BLD-RTO: 0 /100 WBC
PLATELET # BLD AUTO: 243 K/UL (ref 150–450)
PMV BLD AUTO: 10.1 FL (ref 9.2–12.9)
POTASSIUM SERPL-SCNC: 4.6 MMOL/L (ref 3.5–5.1)
PROT SERPL-MCNC: 7.5 G/DL (ref 6–8.4)
RBC # BLD AUTO: 4.15 M/UL (ref 4.6–6.2)
SODIUM SERPL-SCNC: 140 MMOL/L (ref 136–145)
TRIGL SERPL-MCNC: 101 MG/DL (ref 30–150)
WBC # BLD AUTO: 7.35 K/UL (ref 3.9–12.7)

## 2021-08-28 PROCEDURE — 87536 HIV-1 QUANT&REVRSE TRNSCRPJ: CPT | Performed by: INTERNAL MEDICINE

## 2021-08-28 PROCEDURE — 36415 COLL VENOUS BLD VENIPUNCTURE: CPT | Mod: PO | Performed by: INTERNAL MEDICINE

## 2021-08-28 PROCEDURE — 84153 ASSAY OF PSA TOTAL: CPT | Performed by: INTERNAL MEDICINE

## 2021-08-28 PROCEDURE — 85025 COMPLETE CBC W/AUTO DIFF WBC: CPT | Performed by: INTERNAL MEDICINE

## 2021-08-28 PROCEDURE — 80053 COMPREHEN METABOLIC PANEL: CPT | Performed by: INTERNAL MEDICINE

## 2021-08-28 PROCEDURE — 86592 SYPHILIS TEST NON-TREP QUAL: CPT | Performed by: INTERNAL MEDICINE

## 2021-08-28 PROCEDURE — 80061 LIPID PANEL: CPT | Performed by: INTERNAL MEDICINE

## 2021-09-02 LAB — RPR SER QL: NORMAL

## 2021-09-04 LAB — HIV1 RNA # PLAS NAA DL=20: NORMAL COPIES/ML

## 2021-09-23 ENCOUNTER — OFFICE VISIT (OUTPATIENT)
Dept: INFECTIOUS DISEASES | Facility: CLINIC | Age: 56
End: 2021-09-23
Payer: COMMERCIAL

## 2021-09-23 VITALS
SYSTOLIC BLOOD PRESSURE: 129 MMHG | HEIGHT: 72 IN | BODY MASS INDEX: 23.44 KG/M2 | DIASTOLIC BLOOD PRESSURE: 74 MMHG | WEIGHT: 173.06 LBS | TEMPERATURE: 98 F | HEART RATE: 57 BPM

## 2021-09-23 DIAGNOSIS — B20 HUMAN IMMUNODEFICIENCY VIRUS (HIV) DISEASE: Primary | ICD-10-CM

## 2021-09-23 DIAGNOSIS — M25.542 ARTHRALGIA OF BOTH HANDS: ICD-10-CM

## 2021-09-23 DIAGNOSIS — M25.541 ARTHRALGIA OF BOTH HANDS: ICD-10-CM

## 2021-09-23 PROCEDURE — 90734 MENACWYD/MENACWYCRM VACC IM: CPT | Mod: S$GLB,,, | Performed by: INTERNAL MEDICINE

## 2021-09-23 PROCEDURE — 90686 IIV4 VACC NO PRSV 0.5 ML IM: CPT | Mod: S$GLB,,, | Performed by: INTERNAL MEDICINE

## 2021-09-23 PROCEDURE — 90739 HEPB VACC 2/4 DOSE ADULT IM: CPT | Mod: S$GLB,,, | Performed by: INTERNAL MEDICINE

## 2021-09-23 PROCEDURE — 3074F SYST BP LT 130 MM HG: CPT | Mod: CPTII,S$GLB,, | Performed by: INTERNAL MEDICINE

## 2021-09-23 PROCEDURE — 99214 OFFICE O/P EST MOD 30 MIN: CPT | Mod: 25,S$GLB,, | Performed by: INTERNAL MEDICINE

## 2021-09-23 PROCEDURE — 90472 IMMUNIZATION ADMIN EACH ADD: CPT | Mod: S$GLB,,, | Performed by: INTERNAL MEDICINE

## 2021-09-23 PROCEDURE — 90734 MENINGOCOCCAL CONJUGATE VACCINE 4-VALENT IM (MENVEO): ICD-10-PCS | Mod: S$GLB,,, | Performed by: INTERNAL MEDICINE

## 2021-09-23 PROCEDURE — 99214 PR OFFICE/OUTPT VISIT, EST, LEVL IV, 30-39 MIN: ICD-10-PCS | Mod: 25,S$GLB,, | Performed by: INTERNAL MEDICINE

## 2021-09-23 PROCEDURE — 3074F PR MOST RECENT SYSTOLIC BLOOD PRESSURE < 130 MM HG: ICD-10-PCS | Mod: CPTII,S$GLB,, | Performed by: INTERNAL MEDICINE

## 2021-09-23 PROCEDURE — 3008F BODY MASS INDEX DOCD: CPT | Mod: CPTII,S$GLB,, | Performed by: INTERNAL MEDICINE

## 2021-09-23 PROCEDURE — 3078F DIAST BP <80 MM HG: CPT | Mod: CPTII,S$GLB,, | Performed by: INTERNAL MEDICINE

## 2021-09-23 PROCEDURE — 90632 HEPA VACCINE ADULT IM: CPT | Mod: S$GLB,,, | Performed by: INTERNAL MEDICINE

## 2021-09-23 PROCEDURE — 1160F RVW MEDS BY RX/DR IN RCRD: CPT | Mod: CPTII,S$GLB,, | Performed by: INTERNAL MEDICINE

## 2021-09-23 PROCEDURE — 90472 HEPATITIS B (RECOMBINANT) ADJUVANTED, 2 DOSE: ICD-10-PCS | Mod: S$GLB,,, | Performed by: INTERNAL MEDICINE

## 2021-09-23 PROCEDURE — 90632 HEPATITIS A VACCINE ADULT IM: ICD-10-PCS | Mod: S$GLB,,, | Performed by: INTERNAL MEDICINE

## 2021-09-23 PROCEDURE — 1160F PR REVIEW ALL MEDS BY PRESCRIBER/CLIN PHARMACIST DOCUMENTED: ICD-10-PCS | Mod: CPTII,S$GLB,, | Performed by: INTERNAL MEDICINE

## 2021-09-23 PROCEDURE — 90686 FLU VACCINE (QUAD) GREATER THAN OR EQUAL TO 3YO PRESERVATIVE FREE IM: ICD-10-PCS | Mod: S$GLB,,, | Performed by: INTERNAL MEDICINE

## 2021-09-23 PROCEDURE — 1159F MED LIST DOCD IN RCRD: CPT | Mod: CPTII,S$GLB,, | Performed by: INTERNAL MEDICINE

## 2021-09-23 PROCEDURE — 99999 PR PBB SHADOW E&M-EST. PATIENT-LVL IV: ICD-10-PCS | Mod: PBBFAC,,, | Performed by: INTERNAL MEDICINE

## 2021-09-23 PROCEDURE — 3078F PR MOST RECENT DIASTOLIC BLOOD PRESSURE < 80 MM HG: ICD-10-PCS | Mod: CPTII,S$GLB,, | Performed by: INTERNAL MEDICINE

## 2021-09-23 PROCEDURE — 99999 PR PBB SHADOW E&M-EST. PATIENT-LVL IV: CPT | Mod: PBBFAC,,, | Performed by: INTERNAL MEDICINE

## 2021-09-23 PROCEDURE — 90739 HEPATITIS B (RECOMBINANT) ADJUVANTED, 2 DOSE: ICD-10-PCS | Mod: S$GLB,,, | Performed by: INTERNAL MEDICINE

## 2021-09-23 PROCEDURE — 90471 IMMUNIZATION ADMIN: CPT | Mod: S$GLB,,, | Performed by: INTERNAL MEDICINE

## 2021-09-23 PROCEDURE — 1159F PR MEDICATION LIST DOCUMENTED IN MEDICAL RECORD: ICD-10-PCS | Mod: CPTII,S$GLB,, | Performed by: INTERNAL MEDICINE

## 2021-09-23 PROCEDURE — 90471 FLU VACCINE (QUAD) GREATER THAN OR EQUAL TO 3YO PRESERVATIVE FREE IM: ICD-10-PCS | Mod: S$GLB,,, | Performed by: INTERNAL MEDICINE

## 2021-09-23 PROCEDURE — 3008F PR BODY MASS INDEX (BMI) DOCUMENTED: ICD-10-PCS | Mod: CPTII,S$GLB,, | Performed by: INTERNAL MEDICINE

## 2021-09-23 RX ORDER — BRIMONIDINE TARTRATE 2 MG/ML
1 SOLUTION/ DROPS OPHTHALMIC 2 TIMES DAILY
COMMUNITY
Start: 2021-08-28 | End: 2023-06-29

## 2021-10-05 ENCOUNTER — PATIENT MESSAGE (OUTPATIENT)
Dept: INFECTIOUS DISEASES | Facility: CLINIC | Age: 56
End: 2021-10-05

## 2021-10-06 ENCOUNTER — SPECIALTY PHARMACY (OUTPATIENT)
Dept: PHARMACY | Facility: CLINIC | Age: 56
End: 2021-10-06

## 2021-10-06 ENCOUNTER — TELEPHONE (OUTPATIENT)
Dept: INFECTIOUS DISEASES | Facility: CLINIC | Age: 56
End: 2021-10-06

## 2021-10-06 DIAGNOSIS — B20 HUMAN IMMUNODEFICIENCY VIRUS (HIV) DISEASE: Primary | ICD-10-CM

## 2021-10-11 ENCOUNTER — TELEPHONE (OUTPATIENT)
Dept: INFECTIOUS DISEASES | Facility: CLINIC | Age: 56
End: 2021-10-11

## 2021-10-11 ENCOUNTER — SPECIALTY PHARMACY (OUTPATIENT)
Dept: PHARMACY | Facility: CLINIC | Age: 56
End: 2021-10-11

## 2021-10-11 DIAGNOSIS — B20 HUMAN IMMUNODEFICIENCY VIRUS (HIV) DISEASE: Primary | ICD-10-CM

## 2021-10-11 RX ORDER — AMOXICILLIN 500 MG
CAPSULE ORAL DAILY
COMMUNITY

## 2021-11-08 ENCOUNTER — PATIENT MESSAGE (OUTPATIENT)
Dept: INFECTIOUS DISEASES | Facility: CLINIC | Age: 56
End: 2021-11-08
Payer: COMMERCIAL

## 2021-11-09 ENCOUNTER — PATIENT MESSAGE (OUTPATIENT)
Dept: PHARMACY | Facility: CLINIC | Age: 56
End: 2021-11-09
Payer: COMMERCIAL

## 2021-11-10 ENCOUNTER — SPECIALTY PHARMACY (OUTPATIENT)
Dept: PHARMACY | Facility: CLINIC | Age: 56
End: 2021-11-10
Payer: COMMERCIAL

## 2021-11-26 ENCOUNTER — LAB VISIT (OUTPATIENT)
Dept: LAB | Facility: HOSPITAL | Age: 56
End: 2021-11-26
Attending: INTERNAL MEDICINE
Payer: COMMERCIAL

## 2021-11-26 DIAGNOSIS — B20 HUMAN IMMUNODEFICIENCY VIRUS (HIV) DISEASE: ICD-10-CM

## 2021-11-26 PROCEDURE — 87536 HIV-1 QUANT&REVRSE TRNSCRPJ: CPT | Performed by: INTERNAL MEDICINE

## 2021-11-26 PROCEDURE — 86361 T CELL ABSOLUTE COUNT: CPT | Performed by: INTERNAL MEDICINE

## 2021-11-26 PROCEDURE — 36415 COLL VENOUS BLD VENIPUNCTURE: CPT | Mod: PO | Performed by: INTERNAL MEDICINE

## 2021-11-29 LAB
CD3+CD4+ CELLS # BLD: 946 CELLS/UL (ref 300–1400)
CD3+CD4+ CELLS NFR BLD: 39.9 % (ref 28–57)

## 2021-11-30 LAB — HIV1 RNA # PLAS NAA DL=20: NORMAL COPIES/ML

## 2021-12-11 ENCOUNTER — SPECIALTY PHARMACY (OUTPATIENT)
Dept: PHARMACY | Facility: CLINIC | Age: 56
End: 2021-12-11
Payer: COMMERCIAL

## 2021-12-17 ENCOUNTER — TELEPHONE (OUTPATIENT)
Dept: PHARMACY | Facility: CLINIC | Age: 56
End: 2021-12-17
Payer: COMMERCIAL

## 2021-12-17 ENCOUNTER — PATIENT MESSAGE (OUTPATIENT)
Dept: PHARMACY | Facility: CLINIC | Age: 56
End: 2021-12-17
Payer: COMMERCIAL

## 2021-12-30 ENCOUNTER — PATIENT MESSAGE (OUTPATIENT)
Dept: INFECTIOUS DISEASES | Facility: CLINIC | Age: 56
End: 2021-12-30
Payer: COMMERCIAL

## 2022-01-10 ENCOUNTER — PATIENT MESSAGE (OUTPATIENT)
Dept: PHARMACY | Facility: CLINIC | Age: 57
End: 2022-01-10
Payer: COMMERCIAL

## 2022-01-12 ENCOUNTER — SPECIALTY PHARMACY (OUTPATIENT)
Dept: PHARMACY | Facility: CLINIC | Age: 57
End: 2022-01-12
Payer: COMMERCIAL

## 2022-01-12 NOTE — TELEPHONE ENCOUNTER
Specialty Pharmacy - Refill Coordination    Specialty Medication Orders Linked to Encounter    Flowsheet Row Most Recent Value   Medication #1 dolutegravir-rilpivirine 50-25 mg (JULUCA) 50-25 mg Tab (Order#[id number], Rx#[id number])          Refill Questions - Documented Responses    Flowsheet Row Most Recent Value   Patient Availability and HIPAA Verification    Does patient want to proceed with activity? Yes   HIPAA/medical authority confirmed? Yes   Relationship to patient of person spoken to? Self   Refill Screening Questions    Changes to allergies? No   Changes to medications? No   New conditions since last clinic visit? No   Unplanned office visit, urgent care, ED, or hospital admission in the last 4 weeks? No   How does patient/caregiver feel medication is working? Excellent   Financial problems or insurance changes? No   How many doses of your specialty medications were missed in the last 4 weeks? 0   Would patient like to speak to a pharmacist? No   When does the patient need to receive the medication? 01/16/22   Refill Delivery Questions    How will the patient receive the medication? Mail   When does the patient need to receive the medication? 01/16/22   Shipping Address Home   Address in [facility] confirmed and updated if neccessary? Yes   Expected Copay ($) 0   Is the patient able to afford the medication copay? Yes   Payment Method zero copay   Days supply of Refill 30   Supplies needed? No supplies needed   Refill activity completed? Yes   Refill activity plan Refill scheduled   Shipment/Pickup Date: 01/12/22          Current Outpatient Medications   Medication Sig    azithromycin (Z-RITA) 250 MG tablet azithromycin 250 mg tablet   TAKE 2 TABLETS BY MOUTH TODAY, THEN TAKE 1 TABLET DAILY FOR 4 DAYS    brimonidine 0.2% (ALPHAGAN) 0.2 % Drop Place 1 drop into both eyes 2 (two) times daily.    dolutegravir-rilpivirine 50-25 mg (JULUCA) 50-25 mg Tab Take 1 tablet by mouth once daily.     hepatitis B virus vacc.rec,PF, (RECOMBIVAX HB, PF,) 10 mcg/mL Susp Three doses.  Second dose 1 month after first dose.  Third dose 6 months after first.    influenza (FLULAVAL QUADRIVALENT) 60 mcg (15 mcg x 4)/0.5 mL Syrg vaccine Fluarix Quad 9727-4257 (PF) 60 mcg (15 mcg x 4)/0.5 mL IM syringe   TO BE ADMINISTERED BY PHARMACIST FOR IMMUNIZATION    lorazepam (ATIVAN) 0.5 MG tablet Take 1 tablet (0.5 mg total) by mouth every 12 (twelve) hours as needed.    MELATONIN/PYRIDOXINE HCL, B6, (MELATONEX ORAL) Take 5 mg by mouth every evening.    multivitamin with minerals tablet Take 1 tablet by mouth once daily.    omega-3 fatty acids/fish oil (FISH OIL-OMEGA-3 FATTY ACIDS) 300-1,000 mg capsule Take by mouth once daily.    sildenafil (VIAGRA) 100 MG tablet TAKE 1 TABLET BY MOUTH AS NEEDED    traZODone (DESYREL) 50 MG tablet Take 1 tablet (50 mg total) by mouth as needed for Insomnia.    triamcinolone acetonide 0.1% (KENALOG) 0.1 % Lotn Apply to scalp twice daily as needed for scale, redness, or itching    valacyclovir (VALTREX) 1000 MG tablet    Last reviewed on 10/14/2021  7:04 PM by Chapincito Norman MD    Review of patient's allergies indicates:   Allergen Reactions    Sulfa (sulfonamide antibiotics)     Last reviewed on  10/14/2021 7:04 PM by Chapincito Norman      Tasks added this encounter   No tasks added.   Tasks due within next 3 months   1/10/2022 - Refill Call (Auto Added)     Radha Pratt  [facility]  [address] [place] [id number]  Phone: [phone]  Fax: [phone]

## 2022-02-10 ENCOUNTER — SPECIALTY PHARMACY (OUTPATIENT)
Dept: PHARMACY | Facility: CLINIC | Age: 57
End: 2022-02-10
Payer: COMMERCIAL

## 2022-02-10 NOTE — TELEPHONE ENCOUNTER
Specialty Pharmacy - Refill Coordination    Specialty Medication Orders Linked to Encounter    Flowsheet Row Most Recent Value   Medication #1 dolutegravir-rilpivirine 50-25 mg (JULUCA) 50-25 mg Tab (Order#[id number], Rx#[id number])          Refill Questions - Documented Responses    Flowsheet Row Most Recent Value   Patient Availability and HIPAA Verification    Does patient want to proceed with activity? Yes   HIPAA/medical authority confirmed? Yes   Relationship to patient of person spoken to? Self   Refill Screening Questions    Changes to allergies? No   Changes to medications? No   New conditions since last clinic visit? No   Unplanned office visit, urgent care, ED, or hospital admission in the last 4 weeks? No   How does patient/caregiver feel medication is working? Very good   Financial problems or insurance changes? No   How many doses of your specialty medications were missed in the last 4 weeks? 0   Would patient like to speak to a pharmacist? No   When does the patient need to receive the medication? 02/11/22   Refill Delivery Questions    How will the patient receive the medication? Mail   When does the patient need to receive the medication? 02/11/22   Shipping Address Home   Address in [facility] confirmed and updated if neccessary? Yes   Expected Copay ($) 0   Is the patient able to afford the medication copay? Yes   Payment Method zero copay   Days supply of Refill 30   Supplies needed? No supplies needed   Refill activity completed? Yes   Refill activity plan Refill scheduled   Shipment/Pickup Date: 02/10/22          Current Outpatient Medications   Medication Sig    azithromycin (Z-RITA) 250 MG tablet azithromycin 250 mg tablet   TAKE 2 TABLETS BY MOUTH TODAY, THEN TAKE 1 TABLET DAILY FOR 4 DAYS    brimonidine 0.2% (ALPHAGAN) 0.2 % Drop Place 1 drop into both eyes 2 (two) times daily.    dolutegravir-rilpivirine 50-25 mg (JULUCA) 50-25 mg Tab Take 1 tablet by mouth once daily.     hepatitis B virus vacc.rec,PF, (RECOMBIVAX HB, PF,) 10 mcg/mL Susp Three doses.  Second dose 1 month after first dose.  Third dose 6 months after first.    influenza (FLULAVAL QUADRIVALENT) 60 mcg (15 mcg x 4)/0.5 mL Syrg vaccine Fluarix Quad 3813-1902 (PF) 60 mcg (15 mcg x 4)/0.5 mL IM syringe   TO BE ADMINISTERED BY PHARMACIST FOR IMMUNIZATION    lorazepam (ATIVAN) 0.5 MG tablet Take 1 tablet (0.5 mg total) by mouth every 12 (twelve) hours as needed.    MELATONIN/PYRIDOXINE HCL, B6, (MELATONEX ORAL) Take 5 mg by mouth every evening.    multivitamin with minerals tablet Take 1 tablet by mouth once daily.    omega-3 fatty acids/fish oil (FISH OIL-OMEGA-3 FATTY ACIDS) 300-1,000 mg capsule Take by mouth once daily.    sildenafil (VIAGRA) 100 MG tablet TAKE 1 TABLET BY MOUTH AS NEEDED    traZODone (DESYREL) 50 MG tablet Take 1 tablet (50 mg total) by mouth as needed for Insomnia.    triamcinolone acetonide 0.1% (KENALOG) 0.1 % Lotn Apply to scalp twice daily as needed for scale, redness, or itching    valacyclovir (VALTREX) 1000 MG tablet    Last reviewed on 10/14/2021  7:04 PM by Chapincito Norman MD    Review of patient's allergies indicates:   Allergen Reactions    Sulfa (sulfonamide antibiotics)     Last reviewed on  10/14/2021 7:04 PM by Chapincito Norman      Tasks added this encounter   3/6/2022 - Refill Call (Auto Added)   Tasks due within next 3 months   No tasks due.     Stefani Rosales, PharmD  [facility]  [address] [place] [id number]  Phone: [phone]  Fax: [phone]

## 2022-03-07 ENCOUNTER — PATIENT MESSAGE (OUTPATIENT)
Dept: INFECTIOUS DISEASES | Facility: CLINIC | Age: 57
End: 2022-03-07
Payer: COMMERCIAL

## 2022-03-08 ENCOUNTER — TELEPHONE (OUTPATIENT)
Dept: INFECTIOUS DISEASES | Facility: CLINIC | Age: 57
End: 2022-03-08
Payer: COMMERCIAL

## 2022-03-08 DIAGNOSIS — B20 HUMAN IMMUNODEFICIENCY VIRUS (HIV) DISEASE: Primary | ICD-10-CM

## 2022-03-11 ENCOUNTER — PATIENT MESSAGE (OUTPATIENT)
Dept: PHARMACY | Facility: CLINIC | Age: 57
End: 2022-03-11
Payer: COMMERCIAL

## 2022-03-14 ENCOUNTER — SPECIALTY PHARMACY (OUTPATIENT)
Dept: PHARMACY | Facility: CLINIC | Age: 57
End: 2022-03-14
Payer: COMMERCIAL

## 2022-03-14 NOTE — TELEPHONE ENCOUNTER
Specialty Pharmacy - Refill Coordination    Specialty Medication Orders Linked to Encounter    Flowsheet Row Most Recent Value   Medication #1 dolutegravir-rilpivirine 50-25 mg (JULUCA) 50-25 mg Tab (Order#[id number], Rx#[id number])          Refill Questions - Documented Responses    Flowsheet Row Most Recent Value   Patient Availability and HIPAA Verification    Does patient want to proceed with activity? Yes   HIPAA/medical authority confirmed? Yes   Relationship to patient of person spoken to? Self   Refill Screening Questions    Changes to allergies? No   Changes to medications? No   New conditions since last clinic visit? No   Unplanned office visit, urgent care, ED, or hospital admission in the last 4 weeks? No   How does patient/caregiver feel medication is working? Good   Financial problems or insurance changes? No   How many doses of your specialty medications were missed in the last 4 weeks? 0   Would patient like to speak to a pharmacist? No   When does the patient need to receive the medication? 03/16/22   Refill Delivery Questions    How will the patient receive the medication? Mail   When does the patient need to receive the medication? 03/16/22   Shipping Address Home   Address in [facility] confirmed and updated if neccessary? Yes   Expected Copay ($) 0   Is the patient able to afford the medication copay? Yes   Payment Method zero copay   Days supply of Refill 30   Supplies needed? No supplies needed   Refill activity completed? Yes   Refill activity plan Refill scheduled   Shipment/Pickup Date: 03/15/22          Current Outpatient Medications   Medication Sig    azithromycin (Z-RITA) 250 MG tablet azithromycin 250 mg tablet   TAKE 2 TABLETS BY MOUTH TODAY, THEN TAKE 1 TABLET DAILY FOR 4 DAYS    brimonidine 0.2% (ALPHAGAN) 0.2 % Drop Place 1 drop into both eyes 2 (two) times daily.    dolutegravir-rilpivirine 50-25 mg (JULUCA) 50-25 mg Tab Take 1 tablet by mouth once daily.    hepatitis B  virus vacc.rec,PF, (RECOMBIVAX HB, PF,) 10 mcg/mL Susp Three doses.  Second dose 1 month after first dose.  Third dose 6 months after first.    influenza (FLULAVAL QUADRIVALENT) 60 mcg (15 mcg x 4)/0.5 mL Syrg vaccine Fluarix Quad 6150-5179 (PF) 60 mcg (15 mcg x 4)/0.5 mL IM syringe   TO BE ADMINISTERED BY PHARMACIST FOR IMMUNIZATION    lorazepam (ATIVAN) 0.5 MG tablet Take 1 tablet (0.5 mg total) by mouth every 12 (twelve) hours as needed.    MELATONIN/PYRIDOXINE HCL, B6, (MELATONEX ORAL) Take 5 mg by mouth every evening.    multivitamin with minerals tablet Take 1 tablet by mouth once daily.    omega-3 fatty acids/fish oil (FISH OIL-OMEGA-3 FATTY ACIDS) 300-1,000 mg capsule Take by mouth once daily.    sildenafil (VIAGRA) 100 MG tablet TAKE 1 TABLET BY MOUTH AS NEEDED    traZODone (DESYREL) 50 MG tablet Take 1 tablet (50 mg total) by mouth as needed for Insomnia.    triamcinolone acetonide 0.1% (KENALOG) 0.1 % Lotn Apply to scalp twice daily as needed for scale, redness, or itching    valacyclovir (VALTREX) 1000 MG tablet    Last reviewed on 10/14/2021  7:04 PM by Chapincito Norman MD    Review of patient's allergies indicates:   Allergen Reactions    Sulfa (sulfonamide antibiotics)     Last reviewed on  10/14/2021 7:04 PM by Chapincito Norman      Tasks added this encounter   4/8/2022 - Refill Call (Auto Added)   Tasks due within next 3 months   No tasks due.     Shellie Lord  [facility]  [address] [place] [address]  Phone: [phone]  Fax: [phone]

## 2022-03-26 ENCOUNTER — LAB VISIT (OUTPATIENT)
Dept: LAB | Facility: HOSPITAL | Age: 57
End: 2022-03-26
Attending: INTERNAL MEDICINE
Payer: COMMERCIAL

## 2022-03-26 DIAGNOSIS — B20 HUMAN IMMUNODEFICIENCY VIRUS (HIV) DISEASE: ICD-10-CM

## 2022-03-26 LAB
ALBUMIN SERPL BCP-MCNC: 4.3 G/DL (ref 3.5–5.2)
ALP SERPL-CCNC: 55 U/L (ref 55–135)
ALT SERPL W/O P-5'-P-CCNC: 37 U/L (ref 10–44)
ANION GAP SERPL CALC-SCNC: 11 MMOL/L (ref 8–16)
AST SERPL-CCNC: 34 U/L (ref 10–40)
BASOPHILS # BLD AUTO: 0.05 K/UL (ref 0–0.2)
BASOPHILS NFR BLD: 0.8 % (ref 0–1.9)
BILIRUB SERPL-MCNC: 0.9 MG/DL (ref 0.1–1)
BUN SERPL-MCNC: 16 MG/DL (ref 6–20)
CALCIUM SERPL-MCNC: 10 MG/DL (ref 8.7–10.5)
CHLORIDE SERPL-SCNC: 106 MMOL/L (ref 95–110)
CHOLEST SERPL-MCNC: 240 MG/DL (ref 120–199)
CHOLEST/HDLC SERPL: 3.9 {RATIO} (ref 2–5)
CO2 SERPL-SCNC: 24 MMOL/L (ref 23–29)
CREAT SERPL-MCNC: 1.2 MG/DL (ref 0.5–1.4)
DIFFERENTIAL METHOD: ABNORMAL
EOSINOPHIL # BLD AUTO: 0.3 K/UL (ref 0–0.5)
EOSINOPHIL NFR BLD: 4.3 % (ref 0–8)
ERYTHROCYTE [DISTWIDTH] IN BLOOD BY AUTOMATED COUNT: 12.5 % (ref 11.5–14.5)
EST. GFR  (AFRICAN AMERICAN): >60 ML/MIN/1.73 M^2
EST. GFR  (NON AFRICAN AMERICAN): >60 ML/MIN/1.73 M^2
GLUCOSE SERPL-MCNC: 98 MG/DL (ref 70–110)
HCT VFR BLD AUTO: 48.2 % (ref 40–54)
HDLC SERPL-MCNC: 62 MG/DL (ref 40–75)
HDLC SERPL: 25.8 % (ref 20–50)
HGB BLD-MCNC: 16.2 G/DL (ref 14–18)
IMM GRANULOCYTES # BLD AUTO: 0.02 K/UL (ref 0–0.04)
IMM GRANULOCYTES NFR BLD AUTO: 0.3 % (ref 0–0.5)
LDLC SERPL CALC-MCNC: 162.2 MG/DL (ref 63–159)
LYMPHOCYTES # BLD AUTO: 2.3 K/UL (ref 1–4.8)
LYMPHOCYTES NFR BLD: 35 % (ref 18–48)
MCH RBC QN AUTO: 31.2 PG (ref 27–31)
MCHC RBC AUTO-ENTMCNC: 33.6 G/DL (ref 32–36)
MCV RBC AUTO: 93 FL (ref 82–98)
MONOCYTES # BLD AUTO: 0.6 K/UL (ref 0.3–1)
MONOCYTES NFR BLD: 9.2 % (ref 4–15)
NEUTROPHILS # BLD AUTO: 3.3 K/UL (ref 1.8–7.7)
NEUTROPHILS NFR BLD: 50.4 % (ref 38–73)
NONHDLC SERPL-MCNC: 178 MG/DL
NRBC BLD-RTO: 0 /100 WBC
PLATELET # BLD AUTO: 222 K/UL (ref 150–450)
PMV BLD AUTO: 10.6 FL (ref 9.2–12.9)
POTASSIUM SERPL-SCNC: 4.4 MMOL/L (ref 3.5–5.1)
PROT SERPL-MCNC: 7.6 G/DL (ref 6–8.4)
RBC # BLD AUTO: 5.19 M/UL (ref 4.6–6.2)
SODIUM SERPL-SCNC: 141 MMOL/L (ref 136–145)
TRIGL SERPL-MCNC: 79 MG/DL (ref 30–150)
WBC # BLD AUTO: 6.54 K/UL (ref 3.9–12.7)

## 2022-03-26 PROCEDURE — 80061 LIPID PANEL: CPT | Performed by: INTERNAL MEDICINE

## 2022-03-26 PROCEDURE — 87536 HIV-1 QUANT&REVRSE TRNSCRPJ: CPT | Performed by: INTERNAL MEDICINE

## 2022-03-26 PROCEDURE — 86592 SYPHILIS TEST NON-TREP QUAL: CPT | Performed by: INTERNAL MEDICINE

## 2022-03-26 PROCEDURE — 86361 T CELL ABSOLUTE COUNT: CPT | Performed by: INTERNAL MEDICINE

## 2022-03-26 PROCEDURE — 80053 COMPREHEN METABOLIC PANEL: CPT | Performed by: INTERNAL MEDICINE

## 2022-03-26 PROCEDURE — 85025 COMPLETE CBC W/AUTO DIFF WBC: CPT | Performed by: INTERNAL MEDICINE

## 2022-03-28 LAB
CD3+CD4+ CELLS # BLD: 1066 CELLS/UL (ref 300–1400)
CD3+CD4+ CELLS NFR BLD: 39.9 % (ref 28–57)
RPR SER QL: NORMAL

## 2022-03-30 LAB — HIV1 RNA # PLAS NAA DL=20: NORMAL COPIES/ML

## 2022-04-05 ENCOUNTER — TELEPHONE (OUTPATIENT)
Dept: INFECTIOUS DISEASES | Facility: CLINIC | Age: 57
End: 2022-04-05
Payer: COMMERCIAL

## 2022-04-05 NOTE — TELEPHONE ENCOUNTER
----- Message from Lily Mo sent at 4/5/2022  1:00 PM CDT -----  Regarding: appt  Contact: pt @ [phone]  Pt has appt 4/8, had death in family and would like to know if he came come on 4/8 but around 3pm. Asking for a call back from office

## 2022-04-08 ENCOUNTER — OFFICE VISIT (OUTPATIENT)
Dept: INFECTIOUS DISEASES | Facility: CLINIC | Age: 57
End: 2022-04-08
Payer: COMMERCIAL

## 2022-04-08 ENCOUNTER — TELEPHONE (OUTPATIENT)
Dept: INFECTIOUS DISEASES | Facility: CLINIC | Age: 57
End: 2022-04-08
Payer: COMMERCIAL

## 2022-04-08 VITALS
HEIGHT: 72 IN | SYSTOLIC BLOOD PRESSURE: 114 MMHG | TEMPERATURE: 98 F | DIASTOLIC BLOOD PRESSURE: 65 MMHG | BODY MASS INDEX: 23.86 KG/M2 | HEART RATE: 56 BPM | WEIGHT: 176.13 LBS

## 2022-04-08 DIAGNOSIS — Z23 IMMUNIZATION DUE: ICD-10-CM

## 2022-04-08 DIAGNOSIS — B20 HUMAN IMMUNODEFICIENCY VIRUS (HIV) DISEASE: ICD-10-CM

## 2022-04-08 DIAGNOSIS — B20 HUMAN IMMUNODEFICIENCY VIRUS (HIV) DISEASE: Primary | ICD-10-CM

## 2022-04-08 DIAGNOSIS — E78.2 MIXED HYPERLIPIDEMIA: ICD-10-CM

## 2022-04-08 PROCEDURE — 3074F SYST BP LT 130 MM HG: CPT | Mod: CPTII,S$GLB,, | Performed by: INTERNAL MEDICINE

## 2022-04-08 PROCEDURE — 90471 IMMUNIZATION ADMIN: CPT | Mod: S$GLB,,, | Performed by: INTERNAL MEDICINE

## 2022-04-08 PROCEDURE — 99214 OFFICE O/P EST MOD 30 MIN: CPT | Mod: 25,S$GLB,, | Performed by: INTERNAL MEDICINE

## 2022-04-08 PROCEDURE — 90471 HEPATITIS B (RECOMBINANT) ADJUVANTED, 2 DOSE: ICD-10-PCS | Mod: S$GLB,,, | Performed by: INTERNAL MEDICINE

## 2022-04-08 PROCEDURE — 3008F PR BODY MASS INDEX (BMI) DOCUMENTED: ICD-10-PCS | Mod: CPTII,S$GLB,, | Performed by: INTERNAL MEDICINE

## 2022-04-08 PROCEDURE — 90632 HEPA VACCINE ADULT IM: CPT | Mod: S$GLB,,, | Performed by: INTERNAL MEDICINE

## 2022-04-08 PROCEDURE — 1160F PR REVIEW ALL MEDS BY PRESCRIBER/CLIN PHARMACIST DOCUMENTED: ICD-10-PCS | Mod: CPTII,S$GLB,, | Performed by: INTERNAL MEDICINE

## 2022-04-08 PROCEDURE — 3008F BODY MASS INDEX DOCD: CPT | Mod: CPTII,S$GLB,, | Performed by: INTERNAL MEDICINE

## 2022-04-08 PROCEDURE — 90739 HEPATITIS B (RECOMBINANT) ADJUVANTED, 2 DOSE: ICD-10-PCS | Mod: S$GLB,,, | Performed by: INTERNAL MEDICINE

## 2022-04-08 PROCEDURE — 90739 HEPB VACC 2/4 DOSE ADULT IM: CPT | Mod: S$GLB,,, | Performed by: INTERNAL MEDICINE

## 2022-04-08 PROCEDURE — 3078F DIAST BP <80 MM HG: CPT | Mod: CPTII,S$GLB,, | Performed by: INTERNAL MEDICINE

## 2022-04-08 PROCEDURE — 99214 PR OFFICE/OUTPT VISIT, EST, LEVL IV, 30-39 MIN: ICD-10-PCS | Mod: 25,S$GLB,, | Performed by: INTERNAL MEDICINE

## 2022-04-08 PROCEDURE — 3074F PR MOST RECENT SYSTOLIC BLOOD PRESSURE < 130 MM HG: ICD-10-PCS | Mod: CPTII,S$GLB,, | Performed by: INTERNAL MEDICINE

## 2022-04-08 PROCEDURE — 90632 HEPATITIS A VACCINE ADULT IM: ICD-10-PCS | Mod: S$GLB,,, | Performed by: INTERNAL MEDICINE

## 2022-04-08 PROCEDURE — 90472 IMMUNIZATION ADMIN EACH ADD: CPT | Mod: S$GLB,,, | Performed by: INTERNAL MEDICINE

## 2022-04-08 PROCEDURE — 1160F RVW MEDS BY RX/DR IN RCRD: CPT | Mod: CPTII,S$GLB,, | Performed by: INTERNAL MEDICINE

## 2022-04-08 PROCEDURE — 1159F PR MEDICATION LIST DOCUMENTED IN MEDICAL RECORD: ICD-10-PCS | Mod: CPTII,S$GLB,, | Performed by: INTERNAL MEDICINE

## 2022-04-08 PROCEDURE — 99999 PR PBB SHADOW E&M-EST. PATIENT-LVL IV: ICD-10-PCS | Mod: PBBFAC,,, | Performed by: INTERNAL MEDICINE

## 2022-04-08 PROCEDURE — 99999 PR PBB SHADOW E&M-EST. PATIENT-LVL IV: CPT | Mod: PBBFAC,,, | Performed by: INTERNAL MEDICINE

## 2022-04-08 PROCEDURE — 3078F PR MOST RECENT DIASTOLIC BLOOD PRESSURE < 80 MM HG: ICD-10-PCS | Mod: CPTII,S$GLB,, | Performed by: INTERNAL MEDICINE

## 2022-04-08 PROCEDURE — 90472 HEPATITIS A VACCINE ADULT IM: ICD-10-PCS | Mod: S$GLB,,, | Performed by: INTERNAL MEDICINE

## 2022-04-08 PROCEDURE — 1159F MED LIST DOCD IN RCRD: CPT | Mod: CPTII,S$GLB,, | Performed by: INTERNAL MEDICINE

## 2022-04-08 RX ORDER — DOLUTEGRAVIR SODIUM AND RILPIVIRINE HYDROCHLORIDE 50; 25 MG/1; MG/1
1 TABLET, FILM COATED ORAL DAILY
Qty: 30 TABLET | Refills: 5 | Status: SHIPPED | OUTPATIENT
Start: 2022-04-08 | End: 2022-04-08

## 2022-04-08 RX ORDER — DOLUTEGRAVIR SODIUM AND RILPIVIRINE HYDROCHLORIDE 50; 25 MG/1; MG/1
1 TABLET, FILM COATED ORAL DAILY
Qty: 30 TABLET | Refills: 11 | Status: SHIPPED | OUTPATIENT
Start: 2022-04-08 | End: 2023-03-31 | Stop reason: SDUPTHER

## 2022-04-08 NOTE — TELEPHONE ENCOUNTER
----- Message from Yahir Baker sent at 4/8/2022  2:39 PM CDT -----  Regarding: call bk about a late arrival today      The Pt states that they got past one accident and [place]    The Pt states that he would like a call bk to make sure you will still be able to see him as it might be about 15 mins.    Ph # [phone]  ( if Anahy could call him please )

## 2022-04-08 NOTE — PROGRESS NOTES
Patient received 2 vaccines IM to the right deltoid, Heplisav b #2 anterior, and Hep a #2 posterior.  Tolerated well and left in NAD

## 2022-04-08 NOTE — PROGRESS NOTES
Subjective:      Patient ID: Oscar Gibbs is a 56 y.o. male.    Chief Complaint:Follow-up and HIV Positive/AIDS      History of Present Illness  56 y/o male with a history of HIV diagnosed in 2002.  He was on tenofovir, zidovidune and kaletra.  He was switched to juluca and has remained stable.  He is here for follow up.     Medical History  Basal cell cancer  Cataracts  Glaucoma  Colonoscopy in January 2020  HIV diagnosed in 2002     Surgical History  Lasik surgery     Social History  Born and raised in [place], [place].  Works as a .  Quite tobacco in June, 2014.  A glass of wine per day.  No illicit drugs.   He has been in a committed monogamous relationship with his male partner.     Immunizations  COVID-19 pfizer vaccine X 2 doses (last dose march 15, 2021).  Prevnar 2013  Pneumovax 2018  Tdap 2017  Shingrix vaccine X 2 2019      Review of Systems   Constitutional: Negative for chills, decreased appetite, fever, malaise/fatigue, night sweats, weight gain and weight loss.   HENT: Negative for congestion, ear pain, hearing loss, hoarse voice, sore throat and tinnitus.    Eyes: Negative for blurred vision, redness and visual disturbance.   Cardiovascular: Negative for chest pain, leg swelling and palpitations.   Respiratory: Negative for cough, hemoptysis, shortness of breath, sputum production and wheezing.    Hematologic/Lymphatic: Negative for adenopathy. Does not bruise/bleed easily.   Skin: Negative for dry skin, itching, rash and suspicious lesions.   Musculoskeletal: Positive for joint pain. Negative for back pain, myalgias and neck pain.   Gastrointestinal: Negative for abdominal pain, constipation, diarrhea, heartburn, nausea and vomiting.   Genitourinary: Negative for dysuria, flank pain, frequency, hematuria, hesitancy and urgency.   Neurological: Negative for dizziness, headaches, numbness, paresthesias and weakness.   Psychiatric/Behavioral: Negative for depression and memory loss.  The patient has insomnia. The patient is not nervous/anxious.      Objective:   Physical Exam  Vitals and nursing note reviewed.   Constitutional:       General: He is not in acute distress.     Appearance: He is well-developed. He is not diaphoretic.   HENT:      Head: Normocephalic and atraumatic.      Right Ear: External ear normal.      Left Ear: External ear normal.      Nose: Nose normal.      Mouth/Throat:      Pharynx: No oropharyngeal exudate.   Eyes:      General: No scleral icterus.        Right eye: No discharge.         Left eye: No discharge.      Conjunctiva/sclera: Conjunctivae normal.      Pupils: Pupils are equal, round, and reactive to light.   Neck:      Thyroid: No thyromegaly.      Vascular: No JVD.      Trachea: No tracheal deviation.   Cardiovascular:      Rate and Rhythm: Normal rate and regular rhythm.      Heart sounds: No murmur heard.    No friction rub. No gallop.   Pulmonary:      Effort: Pulmonary effort is normal. No respiratory distress.      Breath sounds: Normal breath sounds. No stridor. No wheezing or rales.   Chest:      Chest wall: No tenderness.   Abdominal:      General: Bowel sounds are normal. There is no distension.      Palpations: Abdomen is soft. There is no mass.      Tenderness: There is no abdominal tenderness. There is no guarding or rebound.   Musculoskeletal:         General: No tenderness. Normal range of motion.      Cervical back: Normal range of motion and neck supple.   Lymphadenopathy:      Cervical: No cervical adenopathy.   Skin:     General: Skin is warm.      Coloration: Skin is not pale.      Findings: No erythema or rash.   Neurological:      Mental Status: He is alert and oriented to person, place, and time.      Cranial Nerves: No cranial nerve deficit.      Motor: No abnormal muscle tone.      Coordination: Coordination normal.      Deep Tendon Reflexes: Reflexes are normal and symmetric. Reflexes normal.   Psychiatric:         Behavior: Behavior  normal.         Thought Content: Thought content normal.         Judgment: Judgment normal.       CD4 % [place] T Cell   Date Value Ref Range Status   03/26/2022 39.9 28 - 57 % Final   11/26/2021 39.9 28 - 57 % Final   03/13/2021 41.4 28 - 57 % Final     Absolute CD4   Date Value Ref Range Status   03/26/2022 1066 300 - 1400 cells/ul Final     Comment:     This test was developed and its performance characteristics   determined by [facility].    It has not been cleared or approved by the U.S. Food and Drug   Administration. The FDA has determined that such clearance or   approval is not necessary.  This test is used for clinical purposes.    It should not be regarded as investigational or for research.  This   laboratory is certified under CLIA-88 as qualified to perform high   complexity clinical laboratory testing.     11/26/2021 946 300 - 1400 cells/ul Final     Comment:     This test was developed and its performance characteristics   determined by [facility].    It has not been cleared or approved by the U.S. Food and Drug   Administration. The FDA has determined that such clearance or   approval is not necessary.  This test is used for clinical purposes.    It should not be regarded as investigational or for research.  This   laboratory is certified under CLIA-88 as qualified to perform high   complexity clinical laboratory testing.     03/13/2021 959 300 - 1400 cells/ul Final     Comment:     This test was developed and its performance characteristics   determined by [facility].    It has not been cleared or approved by the U.S. Food and Drug   Administration. The FDA has determined that such clearance or   approval is not necessary.  This test is used for clinical purposes.    It should not be regarded as investigational or for research.  This   laboratory is certified under CLIA-88 as  qualified to perform high   complexity clinical laboratory testing.       HIV-1 RNA   Date Value Ref Range Status   12/15/2017 Not detected Not detected Final   06/10/2017 Not detected Not detected Final   12/03/2016 Not detected Not detected Final     HIV-1 RNA, Quantitative   Date Value Ref Range Status   03/26/2022 Undetected Undetected copies/mL Final     Comment:     Result in log copies/mL is Undetected.    -------------------ADDITIONAL INFORMATION-------------------  The quantification range of this assay is 20 to 10,000,000   copies/mL (1.30 log to 7.00 log copies/mL). Testing was   performed using the paulo HIV-1 test (Roche [employer] with the paulo 6800 System.  This test has been modified from the 's   instructions. Its performance characteristics were   determined by [facility] in a manner consistent with CLIA   requirements. This test has not been cleared or approved by   the U.S. Food and Drug Administration.    Test Performed by:  [place] [address]  : Oscar Astorga M.D. Ph.D.; CLIA# [id number]     11/26/2021 Undetected Undetected copies/mL Final     Comment:     Result in log copies/mL is Undetected.    -------------------ADDITIONAL INFORMATION-------------------  The quantification range of this assay is 20 to 10,000,000   copies/mL (1.30 log to 7.00 log copies/mL). Testing was   performed using the paulo HIV-1 test ([employer]   [employer] with the paulo 6800 System.  This test has been modified from the 's   instructions. Its performance characteristics were   determined by [facility] in a manner consistent with CLIA   requirements. This test has not been cleared or approved by   the U.S. Food and Drug Administration.    Test Performed by:  [place] [address]  :  Oscar Astorga M.D. Ph.D.; CLIA# [id number]     08/28/2021 Undetected Undetected copies/mL Final     Comment:     Result in log copies/mL is Undetected.    -------------------ADDITIONAL INFORMATION-------------------  The quantification range of this assay is 20 to 10,000,000   copies/mL (1.30 log to 7.00 log copies/mL). Testing was   performed using the paulo HIV-1 test (Roche [employer]   [employer] with the paulo [employer] System.  This test has been modified from the 's   instructions. Its performance characteristics were   determined by [facility] in a manner consistent with CLIA   requirements. This test has not been cleared or approved by   the U.S. Food and Drug Administration.    Test Performed by:  [facility] [address] [place] [address]  : Oscar Astorga M.D. Ph.D.; CLIA# [id number]       HIV 1 RNA Ultra   Date Value Ref Range Status   03/13/2021 <40 <40 Copies/mL Final   03/13/2021 <40 <40 Copies/mL Final   12/13/2019 <40 <40 Copies/mL Final       Assessment:       1. Human immunodeficiency virus (HIV) disease :  Labs reviewed with the patient.  He is to continue on juluca.   2. Immunization due :  Will give hepatitis A and hepatitis B vaccination.   3. Mixed hyperlipidemia :  Will arrange for fasting lipids.         Plan:       Human immunodeficiency virus (HIV) disease  -     dolutegravir-rilpivirine 50-25 mg (JULUCA) 50-25 mg Tab; Take 1 tablet by mouth once daily.  Dispense: 30 tablet; Refill: 11    Immunization due  -     Hepatitis A Vaccine (Adult) (IM); Future; Expected date: 04/08/2022  -     Hepatitis B (Recombinant) Adjuvanted, 2 dose; Future; Expected date: 04/08/2022    Mixed hyperlipidemia  -     Lipid Panel; Future; Expected date: 04/08/2022

## 2022-04-13 ENCOUNTER — SPECIALTY PHARMACY (OUTPATIENT)
Dept: PHARMACY | Facility: CLINIC | Age: 57
End: 2022-04-13
Payer: COMMERCIAL

## 2022-04-13 NOTE — TELEPHONE ENCOUNTER
Specialty Pharmacy - Refill Coordination    Specialty Medication Orders Linked to Encounter    Flowsheet Row Most Recent Value   Medication #1 dolutegravir-rilpivirine 50-25 mg (JULUCA) 50-25 mg Tab (Order#[id number], Rx#[id number])          Refill Questions - Documented Responses    Flowsheet Row Most Recent Value   Patient Availability and HIPAA Verification    Does patient want to proceed with activity? Yes   HIPAA/medical authority confirmed? Yes   Relationship to patient of person spoken to? Self   Refill Screening Questions    Changes to allergies? No   Changes to medications? No   New conditions since last clinic visit? No   Unplanned office visit, urgent care, ED, or hospital admission in the last 4 weeks? No   How does patient/caregiver feel medication is working? Excellent   Financial problems or insurance changes? No   How many doses of your specialty medications were missed in the last 4 weeks? 0   Would patient like to speak to a pharmacist? No   When does the patient need to receive the medication? 04/17/22   Refill Delivery Questions    How will the patient receive the medication? Mail   When does the patient need to receive the medication? 04/17/22   Shipping Address Home   Address in [facility] confirmed and updated if neccessary? Yes   Expected Copay ($) 0   Is the patient able to afford the medication copay? Yes   Payment Method zero copay   Days supply of Refill 30   Supplies needed? No supplies needed   Refill activity completed? Yes   Refill activity plan Refill scheduled   Shipment/Pickup Date: 04/14/22          Current Outpatient Medications   Medication Sig    azithromycin (Z-RITA) 250 MG tablet azithromycin 250 mg tablet   TAKE 2 TABLETS BY MOUTH TODAY, THEN TAKE 1 TABLET DAILY FOR 4 DAYS    brimonidine 0.2% (ALPHAGAN) 0.2 % Drop Place 1 drop into both eyes 2 (two) times daily.    dolutegravir-rilpivirine 50-25 mg (JULUCA) 50-25 mg Tab Take 1 tablet by mouth once daily.     hepatitis B virus vacc.rec,PF, (RECOMBIVAX HB, PF,) 10 mcg/mL Susp Three doses.  Second dose 1 month after first dose.  Third dose 6 months after first.    influenza (FLULAVAL QUADRIVALENT) 60 mcg (15 mcg x 4)/0.5 mL Syrg vaccine Fluarix Quad 3430-2823 (PF) 60 mcg (15 mcg x 4)/0.5 mL IM syringe   TO BE ADMINISTERED BY PHARMACIST FOR IMMUNIZATION    lorazepam (ATIVAN) 0.5 MG tablet Take 1 tablet (0.5 mg total) by mouth every 12 (twelve) hours as needed.    MELATONIN/PYRIDOXINE HCL, B6, (MELATONEX ORAL) Take 5 mg by mouth every evening.    multivitamin with minerals tablet Take 1 tablet by mouth once daily.    omega-3 fatty acids/fish oil (FISH OIL-OMEGA-3 FATTY ACIDS) 300-1,000 mg capsule Take by mouth once daily.    sildenafil (VIAGRA) 100 MG tablet TAKE 1 TABLET BY MOUTH AS NEEDED    traZODone (DESYREL) 50 MG tablet Take 1 tablet (50 mg total) by mouth as needed for Insomnia.    triamcinolone acetonide 0.1% (KENALOG) 0.1 % Lotn Apply to scalp twice daily as needed for scale, redness, or itching    valacyclovir (VALTREX) 1000 MG tablet    Last reviewed on 4/8/2022  3:21 PM by Anahy Wilson MA    Review of patient's allergies indicates:   Allergen Reactions    Sulfa (sulfonamide antibiotics)     Last reviewed on  4/8/2022 3:20 PM by Anahy Wilson      Tasks added this encounter   No tasks added.   Tasks due within next 3 months   4/8/2022 - Refill Call (Auto Added)     Radha Pratt  [facility]  [address] [place] [id number]  Phone: [phone]  Fax: [phone]

## 2022-04-18 ENCOUNTER — PATIENT MESSAGE (OUTPATIENT)
Dept: ADMINISTRATIVE | Facility: OTHER | Age: 57
End: 2022-04-18
Payer: COMMERCIAL

## 2022-05-10 ENCOUNTER — PATIENT MESSAGE (OUTPATIENT)
Dept: PHARMACY | Facility: CLINIC | Age: 57
End: 2022-05-10
Payer: COMMERCIAL

## 2022-05-13 ENCOUNTER — SPECIALTY PHARMACY (OUTPATIENT)
Dept: PHARMACY | Facility: CLINIC | Age: 57
End: 2022-05-13
Payer: COMMERCIAL

## 2022-06-10 ENCOUNTER — PATIENT MESSAGE (OUTPATIENT)
Dept: PHARMACY | Facility: CLINIC | Age: 57
End: 2022-06-10
Payer: COMMERCIAL

## 2022-06-10 ENCOUNTER — SPECIALTY PHARMACY (OUTPATIENT)
Dept: PHARMACY | Facility: CLINIC | Age: 57
End: 2022-06-10
Payer: COMMERCIAL

## 2022-06-10 NOTE — TELEPHONE ENCOUNTER
Specialty Pharmacy - Refill Coordination    Specialty Medication Orders Linked to Encounter    Flowsheet Row Most Recent Value   Medication #1 dolutegravir-rilpivirine 50-25 mg (JULUCA) 50-25 mg Tab (Order#[id number], Rx#[id number])          Refill Questions - Documented Responses    Flowsheet Row Most Recent Value   Patient Availability and HIPAA Verification    Does patient want to proceed with activity? Yes   HIPAA/medical authority confirmed? Yes   Relationship to patient of person spoken to? Self   Refill Screening Questions    Changes to allergies? No   Changes to medications? No   New conditions since last clinic visit? No   Unplanned office visit, urgent care, ED, or hospital admission in the last 4 weeks? No   How does patient/caregiver feel medication is working? Good   Financial problems or insurance changes? No   How many doses of your specialty medications were missed in the last 4 weeks? 0   Would patient like to speak to a pharmacist? No   When does the patient need to receive the medication? 06/15/22   Refill Delivery Questions    How will the patient receive the medication? Mail   When does the patient need to receive the medication? 06/15/22   Shipping Address Home   Address in [facility] confirmed and updated if neccessary? Yes   Expected Copay ($) 0   Is the patient able to afford the medication copay? Yes   Payment Method zero copay   Days supply of Refill 30   Supplies needed? No supplies needed   Refill activity completed? Yes   Refill activity plan Refill scheduled   Shipment/Pickup Date: 06/13/22          Current Outpatient Medications   Medication Sig    azithromycin (Z-RITA) 250 MG tablet azithromycin 250 mg tablet   TAKE 2 TABLETS BY MOUTH TODAY, THEN TAKE 1 TABLET DAILY FOR 4 DAYS    brimonidine 0.2% (ALPHAGAN) 0.2 % Drop Place 1 drop into both eyes 2 (two) times daily.    dolutegravir-rilpivirine 50-25 mg (JULUCA) 50-25 mg Tab Take 1 tablet by mouth once daily.    hepatitis B  virus vacc.rec,PF, (RECOMBIVAX HB, PF,) 10 mcg/mL Susp Three doses.  Second dose 1 month after first dose.  Third dose 6 months after first.    influenza (FLULAVAL QUADRIVALENT) 60 mcg (15 mcg x 4)/0.5 mL Syrg vaccine Fluarix Quad 3553-9729 (PF) 60 mcg (15 mcg x 4)/0.5 mL IM syringe   TO BE ADMINISTERED BY PHARMACIST FOR IMMUNIZATION    lorazepam (ATIVAN) 0.5 MG tablet Take 1 tablet (0.5 mg total) by mouth every 12 (twelve) hours as needed.    MELATONIN/PYRIDOXINE HCL, B6, (MELATONEX ORAL) Take 5 mg by mouth every evening.    multivitamin with minerals tablet Take 1 tablet by mouth once daily.    omega-3 fatty acids/fish oil (FISH OIL-OMEGA-3 FATTY ACIDS) 300-1,000 mg capsule Take by mouth once daily.    sildenafil (VIAGRA) 100 MG tablet TAKE 1 TABLET BY MOUTH AS NEEDED    traZODone (DESYREL) 50 MG tablet Take 1 tablet (50 mg total) by mouth as needed for Insomnia.    triamcinolone acetonide 0.1% (KENALOG) 0.1 % Lotn Apply to scalp twice daily as needed for scale, redness, or itching    valacyclovir (VALTREX) 1000 MG tablet    Last reviewed on 4/24/2022  9:44 AM by Chapincito Norman MD    Review of patient's allergies indicates:   Allergen Reactions    Sulfa (sulfonamide antibiotics)     Last reviewed on  4/24/2022 9:44 AM by Chapincito Norman      Tasks added this encounter   7/8/2022 - Refill Call (Auto Added)   Tasks due within next 3 months   No tasks due.     Zen Castro, PharmD  [facility] - Specialty Pharm[facility]  [address] [place] [id number]  Phone: [phone]  Fax: [phone]

## 2022-07-07 ENCOUNTER — PATIENT MESSAGE (OUTPATIENT)
Dept: PHARMACY | Facility: CLINIC | Age: 57
End: 2022-07-07
Payer: COMMERCIAL

## 2022-07-11 ENCOUNTER — SPECIALTY PHARMACY (OUTPATIENT)
Dept: PHARMACY | Facility: CLINIC | Age: 57
End: 2022-07-11
Payer: COMMERCIAL

## 2022-07-11 NOTE — TELEPHONE ENCOUNTER
Specialty Pharmacy - Refill Coordination    Specialty Medication Orders Linked to Encounter    Flowsheet Row Most Recent Value   Medication #1 dolutegravir-rilpivirine 50-25 mg (JULUCA) 50-25 mg Tab (Order#[id number], Rx#[id number])          Refill Questions - Documented Responses    Flowsheet Row Most Recent Value   Patient Availability and HIPAA Verification    Does patient want to proceed with activity? Yes   HIPAA/medical authority confirmed? Yes   Relationship to patient of person spoken to? Self   Refill Screening Questions    Changes to allergies? No   Changes to medications? No   New conditions since last clinic visit? No   Unplanned office visit, urgent care, ED, or hospital admission in the last 4 weeks? No   How does patient/caregiver feel medication is working? Good   Financial problems or insurance changes? No   How many doses of your specialty medications were missed in the last 4 weeks? 0   Would patient like to speak to a pharmacist? No   When does the patient need to receive the medication? 07/14/22   Refill Delivery Questions    How will the patient receive the medication? Mail   When does the patient need to receive the medication? 07/14/22   Shipping Address Home   Address in [facility] confirmed and updated if neccessary? Yes   Expected Copay ($) 0   Is the patient able to afford the medication copay? Yes   Payment Method zero copay   Days supply of Refill 30   Supplies needed? No supplies needed   Refill activity completed? Yes   Refill activity plan Refill scheduled   Shipment/Pickup Date: 07/11/22          Current Outpatient Medications   Medication Sig    azithromycin (Z-RITA) 250 MG tablet azithromycin 250 mg tablet   TAKE 2 TABLETS BY MOUTH TODAY, THEN TAKE 1 TABLET DAILY FOR 4 DAYS    brimonidine 0.2% (ALPHAGAN) 0.2 % Drop Place 1 drop into both eyes 2 (two) times daily.    dolutegravir-rilpivirine 50-25 mg (JULUCA) 50-25 mg Tab Take 1 tablet by mouth once daily.    hepatitis B  virus vacc.rec,PF, (RECOMBIVAX HB, PF,) 10 mcg/mL Susp Three doses.  Second dose 1 month after first dose.  Third dose 6 months after first.    influenza (FLULAVAL QUADRIVALENT) 60 mcg (15 mcg x 4)/0.5 mL Syrg vaccine Fluarix Quad 9476-7810 (PF) 60 mcg (15 mcg x 4)/0.5 mL IM syringe   TO BE ADMINISTERED BY PHARMACIST FOR IMMUNIZATION    lorazepam (ATIVAN) 0.5 MG tablet Take 1 tablet (0.5 mg total) by mouth every 12 (twelve) hours as needed.    MELATONIN/PYRIDOXINE HCL, B6, (MELATONEX ORAL) Take 5 mg by mouth every evening.    multivitamin with minerals tablet Take 1 tablet by mouth once daily.    omega-3 fatty acids/fish oil (FISH OIL-OMEGA-3 FATTY ACIDS) 300-1,000 mg capsule Take by mouth once daily.    sildenafil (VIAGRA) 100 MG tablet TAKE 1 TABLET BY MOUTH AS NEEDED    traZODone (DESYREL) 50 MG tablet Take 1 tablet (50 mg total) by mouth as needed for Insomnia.    triamcinolone acetonide 0.1% (KENALOG) 0.1 % Lotn Apply to scalp twice daily as needed for scale, redness, or itching    valacyclovir (VALTREX) 1000 MG tablet    Last reviewed on 4/24/2022  9:44 AM by Chapincito Norman MD    Review of patient's allergies indicates:   Allergen Reactions    Sulfa (sulfonamide antibiotics)     Last reviewed on  4/24/2022 9:44 AM by Chapincito Norman      Tasks added this encounter   8/6/2022 - Refill Call (Auto Added)   Tasks due within next 3 months   No tasks due.     Lauren Cross, PharmD  [facility] - Specialty Pharmacy  [address] [place] [id number]  Phone: [phone]  Fax: [phone]

## 2022-08-08 ENCOUNTER — PATIENT MESSAGE (OUTPATIENT)
Dept: PHARMACY | Facility: CLINIC | Age: 57
End: 2022-08-08
Payer: COMMERCIAL

## 2022-08-08 ENCOUNTER — SPECIALTY PHARMACY (OUTPATIENT)
Dept: PHARMACY | Facility: CLINIC | Age: 57
End: 2022-08-08
Payer: COMMERCIAL

## 2022-08-08 NOTE — TELEPHONE ENCOUNTER
Specialty Pharmacy - Refill Coordination    Specialty Medication Orders Linked to Encounter    Flowsheet Row Most Recent Value   Medication #1 dolutegravir-rilpivirine 50-25 mg (JULUCA) 50-25 mg Tab (Order#[id number], Rx#[id number])          Refill Questions - Documented Responses    Flowsheet Row Most Recent Value   Patient Availability and HIPAA Verification    Does patient want to proceed with activity? Yes   HIPAA/medical authority confirmed? Yes   Relationship to patient of person spoken to? Self   Refill Screening Questions    Changes to allergies? No   Changes to medications? No   New conditions since last clinic visit? No   Unplanned office visit, urgent care, ED, or hospital admission in the last 4 weeks? No   How does patient/caregiver feel medication is working? Good   Financial problems or insurance changes? No   How many doses of your specialty medications were missed in the last 4 weeks? 0   Would patient like to speak to a pharmacist? No   When does the patient need to receive the medication? 08/13/22   Refill Delivery Questions    How will the patient receive the medication? Mail   When does the patient need to receive the medication? 08/13/22   Shipping Address Home   Address in [facility] confirmed and updated if neccessary? Yes   Expected Copay ($) 0   Is the patient able to afford the medication copay? Yes   Payment Method zero copay   Days supply of Refill 30   Supplies needed? No supplies needed   Refill activity completed? Yes   Refill activity plan Refill scheduled   Shipment/Pickup Date: 08/10/22          Current Outpatient Medications   Medication Sig    azithromycin (Z-RITA) 250 MG tablet azithromycin 250 mg tablet   TAKE 2 TABLETS BY MOUTH TODAY, THEN TAKE 1 TABLET DAILY FOR 4 DAYS    brimonidine 0.2% (ALPHAGAN) 0.2 % Drop Place 1 drop into both eyes 2 (two) times daily.    dolutegravir-rilpivirine 50-25 mg (JULUCA) 50-25 mg Tab Take 1 tablet by mouth once daily.    hepatitis B  virus vacc.rec,PF, (RECOMBIVAX HB, PF,) 10 mcg/mL Susp Three doses.  Second dose 1 month after first dose.  Third dose 6 months after first.    influenza (FLULAVAL QUADRIVALENT) 60 mcg (15 mcg x 4)/0.5 mL Syrg vaccine Fluarix Quad 1099-3495 (PF) 60 mcg (15 mcg x 4)/0.5 mL IM syringe   TO BE ADMINISTERED BY PHARMACIST FOR IMMUNIZATION    lorazepam (ATIVAN) 0.5 MG tablet Take 1 tablet (0.5 mg total) by mouth every 12 (twelve) hours as needed.    MELATONIN/PYRIDOXINE HCL, B6, (MELATONEX ORAL) Take 5 mg by mouth every evening.    multivitamin with minerals tablet Take 1 tablet by mouth once daily.    omega-3 fatty acids/fish oil (FISH OIL-OMEGA-3 FATTY ACIDS) 300-1,000 mg capsule Take by mouth once daily.    sildenafil (VIAGRA) 100 MG tablet TAKE 1 TABLET BY MOUTH AS NEEDED    traZODone (DESYREL) 50 MG tablet Take 1 tablet (50 mg total) by mouth as needed for Insomnia.    triamcinolone acetonide 0.1% (KENALOG) 0.1 % Lotn Apply to scalp twice daily as needed for scale, redness, or itching    valacyclovir (VALTREX) 1000 MG tablet    Last reviewed on 4/24/2022  9:44 AM by Chapincito Norman MD    Review of patient's allergies indicates:   Allergen Reactions    Sulfa (sulfonamide antibiotics)     Last reviewed on  4/24/2022 9:44 AM by Chapincito Norman      Tasks added this encounter   9/5/2022 - Refill Call (Auto Added)   Tasks due within next 3 months   No tasks due.     Carol Camacho  [facility] - Specialty Pharmacy  [address] [place] [address]  Phone: [phone]  Fax: [phone]

## 2022-08-25 ENCOUNTER — SPECIALTY PHARMACY (OUTPATIENT)
Dept: PHARMACY | Facility: CLINIC | Age: 57
End: 2022-08-25
Payer: COMMERCIAL

## 2022-08-25 NOTE — TELEPHONE ENCOUNTER
Specialty Pharmacy - Clinical Reassessment    Specialty Medication Orders Linked to Encounter    Flowsheet Row Most Recent Value   Medication #1 dolutegravir-rilpivirine 50-25 mg (JULUCA) 50-25 mg Tab (Order#[id number], Rx#[id number])        Patient Diagnosis   B20 - HIV infection    Specialty clinical pharmacist review completed for an annual review of reassessment. Reviewed the following areas: current med list, reports of adverse effects, adherence and progress towards therapeutic goals.    Recommendations: none at this time.    Tasks added this encounter   5/25/2023 - Clinical - Follow Up Assesement (Annual)   Tasks due within next 3 months   9/5/2022 - Refill Call (Auto Added)     Janiya Ceballos, PharmD  [facility]  [address]  [facility] [address]  Phone: [phone]  Fax: [phone]

## 2022-09-07 ENCOUNTER — SPECIALTY PHARMACY (OUTPATIENT)
Dept: PHARMACY | Facility: CLINIC | Age: 57
End: 2022-09-07
Payer: COMMERCIAL

## 2022-09-07 ENCOUNTER — PATIENT MESSAGE (OUTPATIENT)
Dept: PHARMACY | Facility: CLINIC | Age: 57
End: 2022-09-07
Payer: COMMERCIAL

## 2022-09-07 NOTE — TELEPHONE ENCOUNTER
Patient returned refill call for Francois. Call got disconnected before refill questions could be completed. Called patient back and left a voicemail.

## 2022-09-07 NOTE — TELEPHONE ENCOUNTER
Specialty Pharmacy - Refill Coordination    Specialty Medication Orders Linked to Encounter      Flowsheet Row Most Recent Value   Medication #1 dolutegravir-rilpivirine 50-25 mg (JULUCA) 50-25 mg Tab (Order#[id number], Rx#[id number])            Refill Questions - Documented Responses      Flowsheet Row Most Recent Value   Patient Availability and HIPAA Verification    Does patient want to proceed with activity? Yes   HIPAA/medical authority confirmed? Yes   Relationship to patient of person spoken to? Self   Refill Screening Questions    Changes to allergies? No   Changes to medications? No   New conditions since last clinic visit? No   Unplanned office visit, urgent care, ED, or hospital admission in the last 4 weeks? No   How does patient/caregiver feel medication is working? Good   Financial problems or insurance changes? No   How many doses of your specialty medications were missed in the last 4 weeks? 0   Would patient like to speak to a pharmacist? No   When does the patient need to receive the medication? 09/11/22   Refill Delivery Questions    How will the patient receive the medication? Mail   When does the patient need to receive the medication? 09/11/22   Shipping Address Home   Address in [facility] confirmed and updated if neccessary? Yes   Expected Copay ($) 0   Is the patient able to afford the medication copay? Yes   Payment Method zero copay   Days supply of Refill 30   Supplies needed? No supplies needed   Refill activity completed? Yes   Refill activity plan Refill scheduled   Shipment/Pickup Date: 09/08/22            Current Outpatient Medications   Medication Sig    azithromycin (Z-RITA) 250 MG tablet azithromycin 250 mg tablet   TAKE 2 TABLETS BY MOUTH TODAY, THEN TAKE 1 TABLET DAILY FOR 4 DAYS    brimonidine 0.2% (ALPHAGAN) 0.2 % Drop Place 1 drop into both eyes 2 (two) times daily.    dolutegravir-rilpivirine 50-25 mg (JULUCA) 50-25 mg Tab Take 1 tablet by mouth once daily.     hepatitis B virus vacc.rec,PF, (RECOMBIVAX HB, PF,) 10 mcg/mL Susp Three doses.  Second dose 1 month after first dose.  Third dose 6 months after first.    influenza (FLULAVAL QUADRIVALENT) 60 mcg (15 mcg x 4)/0.5 mL Syrg vaccine Fluarix Quad 6123-6445 (PF) 60 mcg (15 mcg x 4)/0.5 mL IM syringe   TO BE ADMINISTERED BY PHARMACIST FOR IMMUNIZATION    lorazepam (ATIVAN) 0.5 MG tablet Take 1 tablet (0.5 mg total) by mouth every 12 (twelve) hours as needed.    MELATONIN/PYRIDOXINE HCL, B6, (MELATONEX ORAL) Take 5 mg by mouth every evening.    multivitamin with minerals tablet Take 1 tablet by mouth once daily.    omega-3 fatty acids/fish oil (FISH OIL-OMEGA-3 FATTY ACIDS) 300-1,000 mg capsule Take by mouth once daily.    sildenafil (VIAGRA) 100 MG tablet TAKE 1 TABLET BY MOUTH AS NEEDED    traZODone (DESYREL) 50 MG tablet Take 1 tablet (50 mg total) by mouth as needed for Insomnia.    triamcinolone acetonide 0.1% (KENALOG) 0.1 % Lotn Apply to scalp twice daily as needed for scale, redness, or itching    valacyclovir (VALTREX) 1000 MG tablet    Last reviewed on 4/24/2022  9:44 AM by Chapincito Norman MD    Review of patient's allergies indicates:   Allergen Reactions    Sulfa (sulfonamide antibiotics)     Last reviewed on  4/24/2022 9:44 AM by Chapincito Norman      Tasks added this encounter   10/4/2022 - Refill Call (Auto Added)   Tasks due within next 3 months   No tasks due.     Lauren Herrera, PharmD  [facility] - Specialty Pharmacy  [address] [place] [id number]  Phone: [phone]  Fax: [phone]

## 2022-10-03 ENCOUNTER — PATIENT MESSAGE (OUTPATIENT)
Dept: INFECTIOUS DISEASES | Facility: CLINIC | Age: 57
End: 2022-10-03
Payer: COMMERCIAL

## 2022-10-04 ENCOUNTER — TELEPHONE (OUTPATIENT)
Dept: INFECTIOUS DISEASES | Facility: CLINIC | Age: 57
End: 2022-10-04
Payer: COMMERCIAL

## 2022-10-04 ENCOUNTER — PATIENT MESSAGE (OUTPATIENT)
Dept: PHARMACY | Facility: CLINIC | Age: 57
End: 2022-10-04
Payer: COMMERCIAL

## 2022-10-04 DIAGNOSIS — B20 HUMAN IMMUNODEFICIENCY VIRUS (HIV) DISEASE: Primary | ICD-10-CM

## 2022-10-04 DIAGNOSIS — E78.2 MIXED HYPERLIPIDEMIA: ICD-10-CM

## 2022-10-05 ENCOUNTER — PATIENT MESSAGE (OUTPATIENT)
Dept: INFECTIOUS DISEASES | Facility: CLINIC | Age: 57
End: 2022-10-05
Payer: COMMERCIAL

## 2022-10-07 ENCOUNTER — SPECIALTY PHARMACY (OUTPATIENT)
Dept: PHARMACY | Facility: CLINIC | Age: 57
End: 2022-10-07
Payer: COMMERCIAL

## 2022-10-07 NOTE — TELEPHONE ENCOUNTER
Specialty Pharmacy - Refill Coordination    Specialty Medication Orders Linked to Encounter      Flowsheet Row Most Recent Value   Medication #1 dolutegravir-rilpivirine 50-25 mg (JULUCA) 50-25 mg Tab (Order#[id number], Rx#[id number])            Refill Questions - Documented Responses      Flowsheet Row Most Recent Value   Patient Availability and HIPAA Verification    Does patient want to proceed with activity? Yes   HIPAA/medical authority confirmed? Yes   Relationship to patient of person spoken to? Self   Refill Screening Questions    Changes to allergies? No   Changes to medications? No   New conditions since last clinic visit? No   Unplanned office visit, urgent care, ED, or hospital admission in the last 4 weeks? No   How does patient/caregiver feel medication is working? Good   Financial problems or insurance changes? No   How many doses of your specialty medications were missed in the last 4 weeks? 0   Would patient like to speak to a pharmacist? No   When does the patient need to receive the medication? 10/11/22   Refill Delivery Questions    How will the patient receive the medication? Mail   When does the patient need to receive the medication? 10/11/22   Shipping Address Home   Address in [facility] confirmed and updated if neccessary? Yes   Expected Copay ($) 0   Is the patient able to afford the medication copay? Yes   Payment Method zero copay   Days supply of Refill 30   Supplies needed? No supplies needed   Refill activity completed? Yes   Refill activity plan Refill scheduled   Shipment/Pickup Date: 10/10/22            Current Outpatient Medications   Medication Sig    azithromycin (Z-RITA) 250 MG tablet azithromycin 250 mg tablet   TAKE 2 TABLETS BY MOUTH TODAY, THEN TAKE 1 TABLET DAILY FOR 4 DAYS    brimonidine 0.2% (ALPHAGAN) 0.2 % Drop Place 1 drop into both eyes 2 (two) times daily.    dolutegravir-rilpivirine 50-25 mg (JULUCA) 50-25 mg Tab Take 1 tablet by mouth once daily.     hepatitis B virus vacc.rec,PF, (RECOMBIVAX HB, PF,) 10 mcg/mL Susp Three doses.  Second dose 1 month after first dose.  Third dose 6 months after first.    influenza (FLULAVAL QUADRIVALENT) 60 mcg (15 mcg x 4)/0.5 mL Syrg vaccine Fluarix Quad 3033-8695 (PF) 60 mcg (15 mcg x 4)/0.5 mL IM syringe   TO BE ADMINISTERED BY PHARMACIST FOR IMMUNIZATION    lorazepam (ATIVAN) 0.5 MG tablet Take 1 tablet (0.5 mg total) by mouth every 12 (twelve) hours as needed.    MELATONIN/PYRIDOXINE HCL, B6, (MELATONEX ORAL) Take 5 mg by mouth every evening.    multivitamin with minerals tablet Take 1 tablet by mouth once daily.    omega-3 fatty acids/fish oil (FISH OIL-OMEGA-3 FATTY ACIDS) 300-1,000 mg capsule Take by mouth once daily.    sildenafil (VIAGRA) 100 MG tablet TAKE 1 TABLET BY MOUTH AS NEEDED    traZODone (DESYREL) 50 MG tablet Take 1 tablet (50 mg total) by mouth as needed for Insomnia.    triamcinolone acetonide 0.1% (KENALOG) 0.1 % Lotn Apply to scalp twice daily as needed for scale, redness, or itching    valacyclovir (VALTREX) 1000 MG tablet    Last reviewed on 4/24/2022  9:44 AM by Chapincito Norman MD    Review of patient's allergies indicates:   Allergen Reactions    Sulfa (sulfonamide antibiotics)     Last reviewed on  4/24/2022 9:44 AM by Chapincito Norman      Tasks added this encounter   11/3/2022 - Refill Call (Auto Added)   Tasks due within next 3 months   No tasks due.     Susan Fisher, Patient Care Assistant  [facility]  [address] [place] [address]  Phone: [phone]  Fax: [phone]

## 2022-10-22 ENCOUNTER — LAB VISIT (OUTPATIENT)
Dept: LAB | Facility: HOSPITAL | Age: 57
End: 2022-10-22
Attending: INTERNAL MEDICINE
Payer: COMMERCIAL

## 2022-10-22 DIAGNOSIS — B20 HUMAN IMMUNODEFICIENCY VIRUS (HIV) DISEASE: ICD-10-CM

## 2022-10-22 DIAGNOSIS — E78.2 MIXED HYPERLIPIDEMIA: ICD-10-CM

## 2022-10-22 LAB
ALBUMIN SERPL BCP-MCNC: 4.2 G/DL (ref 3.5–5.2)
ALP SERPL-CCNC: 54 U/L (ref 55–135)
ALT SERPL W/O P-5'-P-CCNC: 34 U/L (ref 10–44)
ANION GAP SERPL CALC-SCNC: 10 MMOL/L (ref 8–16)
AST SERPL-CCNC: 31 U/L (ref 10–40)
BASOPHILS # BLD AUTO: 0.04 K/UL (ref 0–0.2)
BASOPHILS NFR BLD: 0.6 % (ref 0–1.9)
BILIRUB SERPL-MCNC: 0.8 MG/DL (ref 0.1–1)
BUN SERPL-MCNC: 14 MG/DL (ref 6–20)
CALCIUM SERPL-MCNC: 9.7 MG/DL (ref 8.7–10.5)
CHLORIDE SERPL-SCNC: 103 MMOL/L (ref 95–110)
CHOLEST SERPL-MCNC: 227 MG/DL (ref 120–199)
CHOLEST/HDLC SERPL: 4.1 {RATIO} (ref 2–5)
CO2 SERPL-SCNC: 24 MMOL/L (ref 23–29)
CREAT SERPL-MCNC: 1.1 MG/DL (ref 0.5–1.4)
DIFFERENTIAL METHOD: ABNORMAL
EOSINOPHIL # BLD AUTO: 0.2 K/UL (ref 0–0.5)
EOSINOPHIL NFR BLD: 2.6 % (ref 0–8)
ERYTHROCYTE [DISTWIDTH] IN BLOOD BY AUTOMATED COUNT: 12.5 % (ref 11.5–14.5)
EST. GFR  (NO RACE VARIABLE): >60 ML/MIN/1.73 M^2
GLUCOSE SERPL-MCNC: 97 MG/DL (ref 70–110)
HBV SURFACE AB SER-ACNC: 843.76 MIU/ML
HBV SURFACE AB SER-ACNC: REACTIVE M[IU]/ML
HCT VFR BLD AUTO: 45.9 % (ref 40–54)
HDLC SERPL-MCNC: 56 MG/DL (ref 40–75)
HDLC SERPL: 24.7 % (ref 20–50)
HGB BLD-MCNC: 15.8 G/DL (ref 14–18)
IMM GRANULOCYTES # BLD AUTO: 0.01 K/UL (ref 0–0.04)
IMM GRANULOCYTES NFR BLD AUTO: 0.2 % (ref 0–0.5)
LDLC SERPL CALC-MCNC: 155.4 MG/DL (ref 63–159)
LYMPHOCYTES # BLD AUTO: 2.4 K/UL (ref 1–4.8)
LYMPHOCYTES NFR BLD: 39.4 % (ref 18–48)
MCH RBC QN AUTO: 31.4 PG (ref 27–31)
MCHC RBC AUTO-ENTMCNC: 34.4 G/DL (ref 32–36)
MCV RBC AUTO: 91 FL (ref 82–98)
MONOCYTES # BLD AUTO: 0.6 K/UL (ref 0.3–1)
MONOCYTES NFR BLD: 10.2 % (ref 4–15)
NEUTROPHILS # BLD AUTO: 2.9 K/UL (ref 1.8–7.7)
NEUTROPHILS NFR BLD: 47 % (ref 38–73)
NONHDLC SERPL-MCNC: 171 MG/DL
NRBC BLD-RTO: 0 /100 WBC
PLATELET # BLD AUTO: 196 K/UL (ref 150–450)
PMV BLD AUTO: 10.5 FL (ref 9.2–12.9)
POTASSIUM SERPL-SCNC: 4.5 MMOL/L (ref 3.5–5.1)
PROT SERPL-MCNC: 7 G/DL (ref 6–8.4)
RBC # BLD AUTO: 5.03 M/UL (ref 4.6–6.2)
SODIUM SERPL-SCNC: 137 MMOL/L (ref 136–145)
TRIGL SERPL-MCNC: 78 MG/DL (ref 30–150)
WBC # BLD AUTO: 6.2 K/UL (ref 3.9–12.7)

## 2022-10-22 PROCEDURE — 80061 LIPID PANEL: CPT | Performed by: INTERNAL MEDICINE

## 2022-10-22 PROCEDURE — 85025 COMPLETE CBC W/AUTO DIFF WBC: CPT | Performed by: INTERNAL MEDICINE

## 2022-10-22 PROCEDURE — 80053 COMPREHEN METABOLIC PANEL: CPT | Performed by: INTERNAL MEDICINE

## 2022-10-22 PROCEDURE — 36415 COLL VENOUS BLD VENIPUNCTURE: CPT | Mod: PO | Performed by: INTERNAL MEDICINE

## 2022-10-22 PROCEDURE — 87536 HIV-1 QUANT&REVRSE TRNSCRPJ: CPT | Performed by: INTERNAL MEDICINE

## 2022-10-22 PROCEDURE — 86361 T CELL ABSOLUTE COUNT: CPT | Performed by: INTERNAL MEDICINE

## 2022-10-22 PROCEDURE — 86706 HEP B SURFACE ANTIBODY: CPT | Mod: 91 | Performed by: INTERNAL MEDICINE

## 2022-10-24 LAB
CD3+CD4+ CELLS # BLD: 1133 CELLS/UL (ref 300–1400)
CD3+CD4+ CELLS NFR BLD: 43 % (ref 28–57)
HIV1 RNA # SERPL NAA+PROBE: NOT DETECTED COPIES/ML
HIV1 RNA SERPL QL NAA+PROBE: NOT DETECTED

## 2022-10-26 ENCOUNTER — PATIENT MESSAGE (OUTPATIENT)
Dept: INFECTIOUS DISEASES | Facility: CLINIC | Age: 57
End: 2022-10-26
Payer: COMMERCIAL

## 2022-10-27 ENCOUNTER — TELEPHONE (OUTPATIENT)
Dept: INFECTIOUS DISEASES | Facility: CLINIC | Age: 57
End: 2022-10-27
Payer: COMMERCIAL

## 2022-10-27 DIAGNOSIS — F51.01 PRIMARY INSOMNIA: Primary | ICD-10-CM

## 2022-10-27 RX ORDER — TRAZODONE HYDROCHLORIDE 50 MG/1
50 TABLET ORAL
Qty: 30 TABLET | Refills: 3 | Status: SHIPPED | OUTPATIENT
Start: 2022-10-27 | End: 2023-03-31

## 2022-11-03 ENCOUNTER — SPECIALTY PHARMACY (OUTPATIENT)
Dept: PHARMACY | Facility: CLINIC | Age: 57
End: 2022-11-03
Payer: COMMERCIAL

## 2022-11-03 NOTE — TELEPHONE ENCOUNTER
Specialty Pharmacy - Refill Coordination    Specialty Medication Orders Linked to Encounter      Flowsheet Row Most Recent Value   Medication #1 dolutegravir-rilpivirine 50-25 mg (JULUCA) 50-25 mg Tab (Order#[id number], Rx#[id number])            Refill Questions - Documented Responses      Flowsheet Row Most Recent Value   Patient Availability and HIPAA Verification    Does patient want to proceed with activity? Yes   HIPAA/medical authority confirmed? Yes   Relationship to patient of person spoken to? Self   Refill Screening Questions    Changes to allergies? No   Changes to medications? No   New conditions since last clinic visit? No   Unplanned office visit, urgent care, ED, or hospital admission in the last 4 weeks? No   How does patient/caregiver feel medication is working? Good   Financial problems or insurance changes? No   How many doses of your specialty medications were missed in the last 4 weeks? 0   Would patient like to speak to a pharmacist? No   When does the patient need to receive the medication? 11/08/22   Refill Delivery Questions    How will the patient receive the medication? MEDRx   When does the patient need to receive the medication? 11/08/22   Shipping Address Home   Address in [facility] confirmed and updated if neccessary? Yes   Expected Copay ($) 0   Is the patient able to afford the medication copay? Yes   Payment Method zero copay   Days supply of Refill 30   Supplies needed? No supplies needed   Refill activity completed? Yes   Refill activity plan Refill scheduled   Shipment/Pickup Date: 11/07/22            Current Outpatient Medications   Medication Sig    azithromycin (Z-RITA) 250 MG tablet azithromycin 250 mg tablet   TAKE 2 TABLETS BY MOUTH TODAY, THEN TAKE 1 TABLET DAILY FOR 4 DAYS    brimonidine 0.2% (ALPHAGAN) 0.2 % Drop Place 1 drop into both eyes 2 (two) times daily.    dolutegravir-rilpivirine 50-25 mg (JULUCA) 50-25 mg Tab Take 1 tablet by mouth once daily.     hepatitis B virus vacc.rec,PF, (RECOMBIVAX HB, PF,) 10 mcg/mL Susp Three doses.  Second dose 1 month after first dose.  Third dose 6 months after first.    influenza (FLULAVAL QUADRIVALENT) 60 mcg (15 mcg x 4)/0.5 mL Syrg vaccine Fluarix Quad 1167-8334 (PF) 60 mcg (15 mcg x 4)/0.5 mL IM syringe   TO BE ADMINISTERED BY PHARMACIST FOR IMMUNIZATION    lorazepam (ATIVAN) 0.5 MG tablet Take 1 tablet (0.5 mg total) by mouth every 12 (twelve) hours as needed.    MELATONIN/PYRIDOXINE HCL, B6, (MELATONEX ORAL) Take 5 mg by mouth every evening.    multivitamin with minerals tablet Take 1 tablet by mouth once daily.    omega-3 fatty acids/fish oil (FISH OIL-OMEGA-3 FATTY ACIDS) 300-1,000 mg capsule Take by mouth once daily.    sildenafil (VIAGRA) 100 MG tablet TAKE 1 TABLET BY MOUTH AS NEEDED    traZODone (DESYREL) 50 MG tablet Take 1 tablet (50 mg total) by mouth as needed for Insomnia.    triamcinolone acetonide 0.1% (KENALOG) 0.1 % Lotn Apply to scalp twice daily as needed for scale, redness, or itching    valacyclovir (VALTREX) 1000 MG tablet    Last reviewed on 4/24/2022  9:44 AM by Chapincito Norman MD    Review of patient's allergies indicates:   Allergen Reactions    Sulfa (sulfonamide antibiotics)     Last reviewed on  10/27/2022 4:03 PM by Chapincito Norman      Tasks added this encounter   12/1/2022 - Refill Call (Auto Added)   Tasks due within next 3 months   No tasks due.     Shellie Lord  [facility] - Specialty Pharmacy  [address] [place] [id number]  Phone: [phone]  Fax: [phone]

## 2022-11-04 ENCOUNTER — CLINICAL SUPPORT (OUTPATIENT)
Dept: INFECTIOUS DISEASES | Facility: CLINIC | Age: 57
End: 2022-11-04
Payer: COMMERCIAL

## 2022-11-04 ENCOUNTER — LAB VISIT (OUTPATIENT)
Dept: LAB | Facility: HOSPITAL | Age: 57
End: 2022-11-04
Attending: INTERNAL MEDICINE
Payer: COMMERCIAL

## 2022-11-04 ENCOUNTER — OFFICE VISIT (OUTPATIENT)
Dept: INFECTIOUS DISEASES | Facility: CLINIC | Age: 57
End: 2022-11-04
Payer: COMMERCIAL

## 2022-11-04 VITALS
TEMPERATURE: 98 F | BODY MASS INDEX: 23.95 KG/M2 | WEIGHT: 176.81 LBS | SYSTOLIC BLOOD PRESSURE: 127 MMHG | HEIGHT: 72 IN | HEART RATE: 67 BPM | DIASTOLIC BLOOD PRESSURE: 71 MMHG

## 2022-11-04 DIAGNOSIS — M25.542 ARTHRALGIA OF BOTH HANDS: ICD-10-CM

## 2022-11-04 DIAGNOSIS — M25.541 ARTHRALGIA OF BOTH HANDS: ICD-10-CM

## 2022-11-04 DIAGNOSIS — B20 HUMAN IMMUNODEFICIENCY VIRUS (HIV) DISEASE: Primary | ICD-10-CM

## 2022-11-04 DIAGNOSIS — Z23 IMMUNIZATION DUE: ICD-10-CM

## 2022-11-04 LAB
CCP AB SER IA-ACNC: <0.5 U/ML
CRP SERPL-MCNC: 0.6 MG/L (ref 0–8.2)
RHEUMATOID FACT SERPL-ACNC: <13 IU/ML (ref 0–15)

## 2022-11-04 PROCEDURE — 1160F RVW MEDS BY RX/DR IN RCRD: CPT | Mod: CPTII,S$GLB,, | Performed by: INTERNAL MEDICINE

## 2022-11-04 PROCEDURE — 99999 PR PBB SHADOW E&M-EST. PATIENT-LVL IV: ICD-10-PCS | Mod: PBBFAC,,, | Performed by: INTERNAL MEDICINE

## 2022-11-04 PROCEDURE — 3074F SYST BP LT 130 MM HG: CPT | Mod: CPTII,S$GLB,, | Performed by: INTERNAL MEDICINE

## 2022-11-04 PROCEDURE — 99214 PR OFFICE/OUTPT VISIT, EST, LEVL IV, 30-39 MIN: ICD-10-PCS | Mod: 25,S$GLB,, | Performed by: INTERNAL MEDICINE

## 2022-11-04 PROCEDURE — 36415 COLL VENOUS BLD VENIPUNCTURE: CPT | Performed by: INTERNAL MEDICINE

## 2022-11-04 PROCEDURE — 90471 IMMUNIZATION ADMIN: CPT | Mod: S$GLB,,, | Performed by: INTERNAL MEDICINE

## 2022-11-04 PROCEDURE — 90471 PNEUMOCOCCAL CONJUGATE VACCINE 20-VALENT: ICD-10-PCS | Mod: S$GLB,,, | Performed by: INTERNAL MEDICINE

## 2022-11-04 PROCEDURE — 3078F DIAST BP <80 MM HG: CPT | Mod: CPTII,S$GLB,, | Performed by: INTERNAL MEDICINE

## 2022-11-04 PROCEDURE — 86200 CCP ANTIBODY: CPT | Performed by: INTERNAL MEDICINE

## 2022-11-04 PROCEDURE — 99999 PR PBB SHADOW E&M-EST. PATIENT-LVL IV: CPT | Mod: PBBFAC,,, | Performed by: INTERNAL MEDICINE

## 2022-11-04 PROCEDURE — 99999 PR PBB SHADOW E&M-EST. PATIENT-LVL I: ICD-10-PCS | Mod: PBBFAC,,,

## 2022-11-04 PROCEDURE — 99214 OFFICE O/P EST MOD 30 MIN: CPT | Mod: 25,S$GLB,, | Performed by: INTERNAL MEDICINE

## 2022-11-04 PROCEDURE — 86140 C-REACTIVE PROTEIN: CPT | Performed by: INTERNAL MEDICINE

## 2022-11-04 PROCEDURE — 1159F PR MEDICATION LIST DOCUMENTED IN MEDICAL RECORD: ICD-10-PCS | Mod: CPTII,S$GLB,, | Performed by: INTERNAL MEDICINE

## 2022-11-04 PROCEDURE — 86431 RHEUMATOID FACTOR QUANT: CPT | Performed by: INTERNAL MEDICINE

## 2022-11-04 PROCEDURE — 3078F PR MOST RECENT DIASTOLIC BLOOD PRESSURE < 80 MM HG: ICD-10-PCS | Mod: CPTII,S$GLB,, | Performed by: INTERNAL MEDICINE

## 2022-11-04 PROCEDURE — 3074F PR MOST RECENT SYSTOLIC BLOOD PRESSURE < 130 MM HG: ICD-10-PCS | Mod: CPTII,S$GLB,, | Performed by: INTERNAL MEDICINE

## 2022-11-04 PROCEDURE — 90677 PCV20 VACCINE IM: CPT | Mod: S$GLB,,, | Performed by: INTERNAL MEDICINE

## 2022-11-04 PROCEDURE — 90677 PNEUMOCOCCAL CONJUGATE VACCINE 20-VALENT: ICD-10-PCS | Mod: S$GLB,,, | Performed by: INTERNAL MEDICINE

## 2022-11-04 PROCEDURE — 1160F PR REVIEW ALL MEDS BY PRESCRIBER/CLIN PHARMACIST DOCUMENTED: ICD-10-PCS | Mod: CPTII,S$GLB,, | Performed by: INTERNAL MEDICINE

## 2022-11-04 PROCEDURE — 3008F BODY MASS INDEX DOCD: CPT | Mod: CPTII,S$GLB,, | Performed by: INTERNAL MEDICINE

## 2022-11-04 PROCEDURE — 99999 PR PBB SHADOW E&M-EST. PATIENT-LVL I: CPT | Mod: PBBFAC,,,

## 2022-11-04 PROCEDURE — 1159F MED LIST DOCD IN RCRD: CPT | Mod: CPTII,S$GLB,, | Performed by: INTERNAL MEDICINE

## 2022-11-04 PROCEDURE — 3008F PR BODY MASS INDEX (BMI) DOCUMENTED: ICD-10-PCS | Mod: CPTII,S$GLB,, | Performed by: INTERNAL MEDICINE

## 2022-11-04 NOTE — PROGRESS NOTES
Subjective:      Patient ID: Oscar Gibbs is a 57 y.o. male.    Chief Complaint:Follow-up and HIV Positive/AIDS      History of Present Illness  56 y/o male with a history of HIV diagnosed in 2002.  He was on tenofovir, zidovidune and kaletra.  He was switched to juluca and has remained stable.  He is here for follow up.    Interval history  Ringing in his ears.  Rings all the time.  On pressure drops for his eyes.  Also has history of cataracts.  (Eye doctor is Dr. Stu Mayes with [facility] will do a surgical procedure in December.  [phone])       Medical History  Basal cell cancer  Cataracts  Glaucoma  Colonoscopy in January 2020  HIV diagnosed in 2002     Surgical History  Lasik surgery     Social History  Born and raised in [place], [place].  Works as a .  Quite tobacco in June, 2014.  A glass of wine per day.  No illicit drugs.   He has been in a committed monogamous relationship with his male partner.     Immunizations  COVID-19 pfizer vaccine X 2 doses (last dose march 15, 2021).  Prevnar 2013  Pneumovax 2018  Tdap 2017  Shingrix vaccine X 2 2019    Review of Systems   HENT:  Positive for tinnitus.    All other systems reviewed and are negative.  Objective:   Physical Exam  Vitals and nursing note reviewed.   Constitutional:       General: He is not in acute distress.     Appearance: He is well-developed. He is not diaphoretic.   HENT:      Head: Normocephalic and atraumatic.      Right Ear: External ear normal.      Left Ear: External ear normal.      Nose: Nose normal.      Mouth/Throat:      Pharynx: No oropharyngeal exudate.   Eyes:      General: No scleral icterus.        Right eye: No discharge.         Left eye: No discharge.      Conjunctiva/sclera: Conjunctivae normal.      Pupils: Pupils are equal, round, and reactive to light.   Neck:      Thyroid: No thyromegaly.      Vascular: No JVD.      Trachea: No tracheal deviation.   Cardiovascular:      Rate and  Rhythm: Normal rate and regular rhythm.      Heart sounds: No murmur heard.    No friction rub. No gallop.   Pulmonary:      Effort: Pulmonary effort is normal. No respiratory distress.      Breath sounds: Normal breath sounds. No stridor. No wheezing or rales.   Chest:      Chest wall: No tenderness.   Abdominal:      General: Bowel sounds are normal. There is no distension.      Palpations: Abdomen is soft. There is no mass.      Tenderness: There is no abdominal tenderness. There is no guarding or rebound.   Musculoskeletal:         General: No tenderness. Normal range of motion.      Cervical back: Normal range of motion and neck supple.   Lymphadenopathy:      Cervical: No cervical adenopathy.   Skin:     General: Skin is warm.      Coloration: Skin is not pale.      Findings: No erythema or rash.   Neurological:      Mental Status: He is alert and oriented to person, place, and time.      Cranial Nerves: No cranial nerve deficit.      Motor: No abnormal muscle tone.      Coordination: Coordination normal.      Deep Tendon Reflexes: Reflexes are normal and symmetric. Reflexes normal.   Psychiatric:         Behavior: Behavior normal.         Thought Content: Thought content normal.         Judgment: Judgment normal.     CD4 % [place] T Cell   Date Value Ref Range Status   10/22/2022 43.0 28 - 57 % Final   03/26/2022 39.9 28 - 57 % Final   11/26/2021 39.9 28 - 57 % Final       Absolute CD4   Date Value Ref Range Status   10/22/2022 1133 300 - 1400 cells/ul Final     Comment:     This test was developed and its performance characteristics   determined by [facility].    It has not been cleared or approved by the U.S. Food and Drug   Administration. The FDA has determined that such clearance or   approval is not necessary.  This test is used for clinical purposes.    It should not be regarded as investigational or for research.  This   laboratory is certified under CLIA-88 as  qualified to perform high   complexity clinical laboratory testing.     03/26/2022 1066 300 - 1400 cells/ul Final     Comment:     This test was developed and its performance characteristics   determined by [facility].    It has not been cleared or approved by the U.S. Food and Drug   Administration. The FDA has determined that such clearance or   approval is not necessary.  This test is used for clinical purposes.    It should not be regarded as investigational or for research.  This   laboratory is certified under CLIA-88 as qualified to perform high   complexity clinical laboratory testing.     11/26/2021 946 300 - 1400 cells/ul Final     Comment:     This test was developed and its performance characteristics   determined by [facility].    It has not been cleared or approved by the U.S. Food and Drug   Administration. The FDA has determined that such clearance or   approval is not necessary.  This test is used for clinical purposes.    It should not be regarded as investigational or for research.  This   laboratory is certified under CLIA-88 as qualified to perform high   complexity clinical laboratory testing.         HIV-1 RNA   Date Value Ref Range Status   12/15/2017 Not detected Not detected Final   06/10/2017 Not detected Not detected Final   12/03/2016 Not detected Not detected Final     HIV-1 RNA, Quantitative   Date Value Ref Range Status   03/26/2022 Undetected Undetected copies/mL Final     Comment:     Result in log copies/mL is Undetected.    -------------------ADDITIONAL INFORMATION-------------------  The quantification range of this assay is 20 to 10,000,000   copies/mL (1.30 log to 7.00 log copies/mL). Testing was   performed using the paulo HIV-1 test ([employer] with the paulo Scifiniti0 System.  This test has been modified from the 's   instructions. Its performance characteristics  were   determined by [facility] in a manner consistent with CLIA   requirements. This test has not been cleared or approved by   the U.S. Food and Drug Administration.    Test Performed by:  [place] [address]  : Oscar Astorga M.D. Ph.D.; CLIA# [id number]     11/26/2021 Undetected Undetected copies/mL Final     Comment:     Result in log copies/mL is Undetected.    -------------------ADDITIONAL INFORMATION-------------------  The quantification range of this assay is 20 to 10,000,000   copies/mL (1.30 log to 7.00 log copies/mL). Testing was   performed using the paulo HIV-1 test ([employer] with the paulo 6800 System.  This test has been modified from the 's   instructions. Its performance characteristics were   determined by [facility] in a manner consistent with CLIA   requirements. This test has not been cleared or approved by   the U.S. Food and Drug Administration.    Test Performed by:  [place] [address]  : Oscar Astorga M.D. Ph.D.; CLIA# [id number]     08/28/2021 Undetected Undetected copies/mL Final     Comment:     Result in log copies/mL is Undetected.    -------------------ADDITIONAL INFORMATION-------------------  The quantification range of this assay is 20 to 10,000,000   copies/mL (1.30 log to 7.00 log copies/mL). Testing was   performed using the paulo HIV-1 test ([employer] with the paulo 6800 System.  This test has been modified from the 's   instructions. Its performance characteristics were   determined by [facility] in a manner consistent with CLIA   requirements. This test has not been cleared or approved by   the U.S. Food and Drug Administration.    Test Performed by:  [address] [place] [address]  : Oscar Astorga M.D. Ph.D.; CLIA# [id number]         HIV 1 RNA Ultra   Date Value Ref Range Status   03/13/2021 <40 <40 Copies/mL Final   03/13/2021 <40 <40 Copies/mL Final   12/13/2019 <40 <40 Copies/mL Final         Assessment:       1. Human immunodeficiency virus (HIV) disease :  Labs reviewed.  CD4 is 1133.  Viral load is suppressed.  He is to continue on juluca.     2. Arthralgia of both hands :  Will screen for rheumatoid arthritis.   3. Immunization due :  Will give prevnar 20.           Plan:       Human immunodeficiency virus (HIV) disease    Arthralgia of both hands  -     RHEUMATOID FACTOR; Future; Expected date: 11/04/2022  -     C-reactive protein; Future; Expected date: 11/04/2022  -     CYCLIC CITRUL PEPTIDE ANTIBODY, IGG; Future; Expected date: 11/04/2022    Immunization due  -     (In Office Administered) Pneumococcal Conjugate Vaccine (20 Valent) (IM); Future; Expected date: 11/04/2022

## 2022-12-01 ENCOUNTER — PATIENT MESSAGE (OUTPATIENT)
Dept: PHARMACY | Facility: CLINIC | Age: 57
End: 2022-12-01
Payer: COMMERCIAL

## 2022-12-02 ENCOUNTER — SPECIALTY PHARMACY (OUTPATIENT)
Dept: PHARMACY | Facility: CLINIC | Age: 57
End: 2022-12-02
Payer: COMMERCIAL

## 2022-12-02 NOTE — TELEPHONE ENCOUNTER
Specialty Pharmacy - Refill Coordination    Specialty Medication Orders Linked to Encounter      Flowsheet Row Most Recent Value   Medication #1 dolutegravir-rilpivirine 50-25 mg (JULUCA) 50-25 mg Tab (Order#[id number], Rx#[id number])            Refill Questions - Documented Responses      Flowsheet Row Most Recent Value   Patient Availability and HIPAA Verification    Does patient want to proceed with activity? Yes   HIPAA/medical authority confirmed? Yes   Relationship to patient of person spoken to? Self   Refill Screening Questions    Changes to allergies? No   Changes to medications? No   New conditions since last clinic visit? No   Unplanned office visit, urgent care, ED, or hospital admission in the last 4 weeks? No   How does patient/caregiver feel medication is working? Good   Financial problems or insurance changes? No   How many doses of your specialty medications were missed in the last 4 weeks? 0   Would patient like to speak to a pharmacist? No   When does the patient need to receive the medication? 12/08/22   Refill Delivery Questions    How will the patient receive the medication? Mail   When does the patient need to receive the medication? 12/08/22   Shipping Address Home   Address in [facility] confirmed and updated if neccessary? Yes   Expected Copay ($) 0   Is the patient able to afford the medication copay? Yes   Payment Method zero copay   Days supply of Refill 30   Supplies needed? No supplies needed   Refill activity completed? Yes   Refill activity plan Refill scheduled   Shipment/Pickup Date: 12/05/22            Current Outpatient Medications   Medication Sig    azithromycin (Z-RITA) 250 MG tablet azithromycin 250 mg tablet   TAKE 2 TABLETS BY MOUTH TODAY, THEN TAKE 1 TABLET DAILY FOR 4 DAYS    brimonidine 0.2% (ALPHAGAN) 0.2 % Drop Place 1 drop into both eyes 2 (two) times daily.    dolutegravir-rilpivirine 50-25 mg (JULUCA) 50-25 mg Tab Take 1 tablet by mouth once daily.     hepatitis B virus vacc.rec,PF, (RECOMBIVAX HB, PF,) 10 mcg/mL Susp Three doses.  Second dose 1 month after first dose.  Third dose 6 months after first.    influenza (FLULAVAL QUADRIVALENT) 60 mcg (15 mcg x 4)/0.5 mL Syrg vaccine Fluarix Quad 0618-0485 (PF) 60 mcg (15 mcg x 4)/0.5 mL IM syringe   TO BE ADMINISTERED BY PHARMACIST FOR IMMUNIZATION    lorazepam (ATIVAN) 0.5 MG tablet Take 1 tablet (0.5 mg total) by mouth every 12 (twelve) hours as needed.    MELATONIN/PYRIDOXINE HCL, B6, (MELATONEX ORAL) Take 5 mg by mouth every evening.    multivitamin with minerals tablet Take 1 tablet by mouth once daily.    omega-3 fatty acids/fish oil (FISH OIL-OMEGA-3 FATTY ACIDS) 300-1,000 mg capsule Take by mouth once daily.    sildenafil (VIAGRA) 100 MG tablet TAKE 1 TABLET BY MOUTH AS NEEDED    traZODone (DESYREL) 50 MG tablet Take 1 tablet (50 mg total) by mouth as needed for Insomnia.    triamcinolone acetonide 0.1% (KENALOG) 0.1 % Lotn Apply to scalp twice daily as needed for scale, redness, or itching    valacyclovir (VALTREX) 1000 MG tablet    Last reviewed on 11/27/2022  1:32 PM by Chapincito Norman MD    Review of patient's allergies indicates:   Allergen Reactions    Sulfa (sulfonamide antibiotics)     Last reviewed on  11/27/2022 1:32 PM by Chapincito Norman      Tasks added this encounter   12/31/2022 - Refill Call (Auto Added)   Tasks due within next 3 months   No tasks due.     Sima Fisher, Patient Care Assistant  [facility]  [address] [place] [id number]  Phone: [phone]  Fax: [phone]

## 2022-12-09 ENCOUNTER — PATIENT MESSAGE (OUTPATIENT)
Dept: INFECTIOUS DISEASES | Facility: CLINIC | Age: 57
End: 2022-12-09
Payer: COMMERCIAL

## 2022-12-30 ENCOUNTER — PATIENT MESSAGE (OUTPATIENT)
Dept: PHARMACY | Facility: CLINIC | Age: 57
End: 2022-12-30
Payer: COMMERCIAL

## 2023-01-03 ENCOUNTER — SPECIALTY PHARMACY (OUTPATIENT)
Dept: PHARMACY | Facility: CLINIC | Age: 58
End: 2023-01-03
Payer: COMMERCIAL

## 2023-01-03 NOTE — TELEPHONE ENCOUNTER
Specialty Pharmacy - Refill Coordination    Specialty Medication Orders Linked to Encounter      Flowsheet Row Most Recent Value   Medication #1 dolutegravir-rilpivirine 50-25 mg (JULUCA) 50-25 mg Tab (Order#[id number], Rx#[id number])            Refill Questions - Documented Responses      Flowsheet Row Most Recent Value   Patient Availability and HIPAA Verification    Does patient want to proceed with activity? Yes   HIPAA/medical authority confirmed? Yes   Relationship to patient of person spoken to? Self   Refill Screening Questions    Changes to allergies? No   Changes to medications? No   New conditions since last clinic visit? No   Unplanned office visit, urgent care, ED, or hospital admission in the last 4 weeks? No   How does patient/caregiver feel medication is working? Good   Financial problems or insurance changes? No   How many doses of your specialty medications were missed in the last 4 weeks? 0   Would patient like to speak to a pharmacist? No   When does the patient need to receive the medication? 01/07/23   Refill Delivery Questions    How will the patient receive the medication? Mail   When does the patient need to receive the medication? 01/07/23   Shipping Address Home   Address in [facility] confirmed and updated if neccessary? Yes   Expected Copay ($) 0   Is the patient able to afford the medication copay? Yes   Payment Method zero copay   Days supply of Refill 30   Supplies needed? No supplies needed   Refill activity completed? Yes   Refill activity plan Refill scheduled   Shipment/Pickup Date: 01/04/23            Current Outpatient Medications   Medication Sig    azithromycin (Z-RITA) 250 MG tablet azithromycin 250 mg tablet   TAKE 2 TABLETS BY MOUTH TODAY, THEN TAKE 1 TABLET DAILY FOR 4 DAYS    brimonidine 0.2% (ALPHAGAN) 0.2 % Drop Place 1 drop into both eyes 2 (two) times daily.    dolutegravir-rilpivirine 50-25 mg (JULUCA) 50-25 mg Tab Take 1 tablet by mouth once daily.     hepatitis B virus vacc.rec,PF, (RECOMBIVAX HB, PF,) 10 mcg/mL Susp Three doses.  Second dose 1 month after first dose.  Third dose 6 months after first.    influenza (FLULAVAL QUADRIVALENT) 60 mcg (15 mcg x 4)/0.5 mL Syrg vaccine Fluarix Quad 1109-8339 (PF) 60 mcg (15 mcg x 4)/0.5 mL IM syringe   TO BE ADMINISTERED BY PHARMACIST FOR IMMUNIZATION    lorazepam (ATIVAN) 0.5 MG tablet Take 1 tablet (0.5 mg total) by mouth every 12 (twelve) hours as needed.    MELATONIN/PYRIDOXINE HCL, B6, (MELATONEX ORAL) Take 5 mg by mouth every evening.    multivitamin with minerals tablet Take 1 tablet by mouth once daily.    omega-3 fatty acids/fish oil (FISH OIL-OMEGA-3 FATTY ACIDS) 300-1,000 mg capsule Take by mouth once daily.    sildenafil (VIAGRA) 100 MG tablet TAKE 1 TABLET BY MOUTH AS NEEDED    traZODone (DESYREL) 50 MG tablet Take 1 tablet (50 mg total) by mouth as needed for Insomnia.    triamcinolone acetonide 0.1% (KENALOG) 0.1 % Lotn Apply to scalp twice daily as needed for scale, redness, or itching    valacyclovir (VALTREX) 1000 MG tablet    Last reviewed on 11/27/2022  1:32 PM by Chapincito Norman MD    Review of patient's allergies indicates:   Allergen Reactions    Sulfa (sulfonamide antibiotics)     Last reviewed on  11/27/2022 1:32 PM by Chapincito Norman      Tasks added this encounter   1/30/2023 - Refill Call (Auto Added)   Tasks due within next 3 months   No tasks due.     Susan Fisher, Patient Care Assistant  [facility]  [address] [place] [id number]  Phone: [phone]  Fax: [phone]

## 2023-01-31 ENCOUNTER — PATIENT MESSAGE (OUTPATIENT)
Dept: PHARMACY | Facility: CLINIC | Age: 58
End: 2023-01-31
Payer: COMMERCIAL

## 2023-02-01 ENCOUNTER — SPECIALTY PHARMACY (OUTPATIENT)
Dept: PHARMACY | Facility: CLINIC | Age: 58
End: 2023-02-01
Payer: COMMERCIAL

## 2023-02-01 NOTE — TELEPHONE ENCOUNTER
Specialty Pharmacy - Refill Coordination    Specialty Medication Orders Linked to Encounter      Flowsheet Row Most Recent Value   Medication #1 dolutegravir-rilpivirine 50-25 mg (JULUCA) 50-25 mg Tab (Order#[id number], Rx#[id number])            Refill Questions - Documented Responses      Flowsheet Row Most Recent Value   Patient Availability and HIPAA Verification    Does patient want to proceed with activity? Yes   HIPAA/medical authority confirmed? Yes   Relationship to patient of person spoken to? Self   Refill Screening Questions    Changes to allergies? No   Changes to medications? No   New conditions since last clinic visit? No   Unplanned office visit, urgent care, ED, or hospital admission in the last 4 weeks? No   How does patient/caregiver feel medication is working? Good   Financial problems or insurance changes? No   How many doses of your specialty medications were missed in the last 4 weeks? 0   Would patient like to speak to a pharmacist? No   When does the patient need to receive the medication? 02/09/23   Refill Delivery Questions    How will the patient receive the medication? MEDRx   When does the patient need to receive the medication? 02/09/23   Shipping Address Home   Address in [facility] confirmed and updated if neccessary? Yes   Expected Copay ($) 0   Is the patient able to afford the medication copay? Yes   Payment Method zero copay   Days supply of Refill 30   Supplies needed? No supplies needed   Refill activity completed? Yes   Refill activity plan Refill scheduled   Shipment/Pickup Date: 02/06/23            Current Outpatient Medications   Medication Sig    azithromycin (Z-RITA) 250 MG tablet azithromycin 250 mg tablet   TAKE 2 TABLETS BY MOUTH TODAY, THEN TAKE 1 TABLET DAILY FOR 4 DAYS    brimonidine 0.2% (ALPHAGAN) 0.2 % Drop Place 1 drop into both eyes 2 (two) times daily.    dolutegravir-rilpivirine 50-25 mg (JULUCA) 50-25 mg Tab Take 1 tablet by mouth once daily.     hepatitis B virus vacc.rec,PF, (RECOMBIVAX HB, PF,) 10 mcg/mL Susp Three doses.  Second dose 1 month after first dose.  Third dose 6 months after first.    influenza (FLULAVAL QUADRIVALENT) 60 mcg (15 mcg x 4)/0.5 mL Syrg vaccine Fluarix Quad 7239-1799 (PF) 60 mcg (15 mcg x 4)/0.5 mL IM syringe   TO BE ADMINISTERED BY PHARMACIST FOR IMMUNIZATION    lorazepam (ATIVAN) 0.5 MG tablet Take 1 tablet (0.5 mg total) by mouth every 12 (twelve) hours as needed.    MELATONIN/PYRIDOXINE HCL, B6, (MELATONEX ORAL) Take 5 mg by mouth every evening.    multivitamin with minerals tablet Take 1 tablet by mouth once daily.    omega-3 fatty acids/fish oil (FISH OIL-OMEGA-3 FATTY ACIDS) 300-1,000 mg capsule Take by mouth once daily.    sildenafil (VIAGRA) 100 MG tablet TAKE 1 TABLET BY MOUTH AS NEEDED    traZODone (DESYREL) 50 MG tablet Take 1 tablet (50 mg total) by mouth as needed for Insomnia.    triamcinolone acetonide 0.1% (KENALOG) 0.1 % Lotn Apply to scalp twice daily as needed for scale, redness, or itching    valacyclovir (VALTREX) 1000 MG tablet    Last reviewed on 11/27/2022  1:32 PM by Chapincito Norman MD    Review of patient's allergies indicates:   Allergen Reactions    Sulfa (sulfonamide antibiotics)     Last reviewed on  11/27/2022 1:32 PM by Chapincito Norman      Tasks added this encounter   3/4/2023 - Refill Call (Auto Added)   Tasks due within next 3 months   No tasks due.     Zen Castro, PharmD  [facility]  [address] [place] [id number]  Phone: [phone]  Fax: [phone]

## 2023-03-03 ENCOUNTER — SPECIALTY PHARMACY (OUTPATIENT)
Dept: PHARMACY | Facility: CLINIC | Age: 58
End: 2023-03-03
Payer: COMMERCIAL

## 2023-03-03 NOTE — TELEPHONE ENCOUNTER
Specialty Pharmacy - Refill Coordination    Specialty Medication Orders Linked to Encounter      Flowsheet Row Most Recent Value   Medication #1 dolutegravir-rilpivirine 50-25 mg (JULUCA) 50-25 mg Tab (Order#[id number], Rx#[id number])            Refill Questions - Documented Responses      Flowsheet Row Most Recent Value   Patient Availability and HIPAA Verification    Does patient want to proceed with activity? Yes   HIPAA/medical authority confirmed? Yes   Relationship to patient of person spoken to? Self   Refill Screening Questions    Changes to allergies? No   Changes to medications? No   New conditions since last clinic visit? No   Unplanned office visit, urgent care, ED, or hospital admission in the last 4 weeks? No   How does patient/caregiver feel medication is working? Good   Financial problems or insurance changes? No   How many doses of your specialty medications were missed in the last 4 weeks? 0   Would patient like to speak to a pharmacist? No   When does the patient need to receive the medication? 03/08/23   Refill Delivery Questions    How will the patient receive the medication? Mail   When does the patient need to receive the medication? 03/08/23   Shipping Address Home   Address in [facility] confirmed and updated if neccessary? Yes   Expected Copay ($) 0   Is the patient able to afford the medication copay? Yes   Payment Method zero copay   Days supply of Refill 30   Refill activity completed? Yes   Refill activity plan Refill scheduled   Shipment/Pickup Date: 03/06/23            Current Outpatient Medications   Medication Sig    azithromycin (Z-RITA) 250 MG tablet azithromycin 250 mg tablet   TAKE 2 TABLETS BY MOUTH TODAY, THEN TAKE 1 TABLET DAILY FOR 4 DAYS    brimonidine 0.2% (ALPHAGAN) 0.2 % Drop Place 1 drop into both eyes 2 (two) times daily.    dolutegravir-rilpivirine 50-25 mg (JULUCA) 50-25 mg Tab Take 1 tablet by mouth once daily.    hepatitis B virus vacc.rec,PF, (RECOMBIVAX HB,  PF,) 10 mcg/mL Susp Three doses.  Second dose 1 month after first dose.  Third dose 6 months after first.    influenza (FLULAVAL QUADRIVALENT) 60 mcg (15 mcg x 4)/0.5 mL Syrg vaccine Fluarix Quad 8573-4049 (PF) 60 mcg (15 mcg x 4)/0.5 mL IM syringe   TO BE ADMINISTERED BY PHARMACIST FOR IMMUNIZATION    lorazepam (ATIVAN) 0.5 MG tablet Take 1 tablet (0.5 mg total) by mouth every 12 (twelve) hours as needed.    MELATONIN/PYRIDOXINE HCL, B6, (MELATONEX ORAL) Take 5 mg by mouth every evening.    multivitamin with minerals tablet Take 1 tablet by mouth once daily.    omega-3 fatty acids/fish oil (FISH OIL-OMEGA-3 FATTY ACIDS) 300-1,000 mg capsule Take by mouth once daily.    sildenafil (VIAGRA) 100 MG tablet TAKE 1 TABLET BY MOUTH AS NEEDED    traZODone (DESYREL) 50 MG tablet Take 1 tablet (50 mg total) by mouth as needed for Insomnia.    triamcinolone acetonide 0.1% (KENALOG) 0.1 % Lotn Apply to scalp twice daily as needed for scale, redness, or itching    valacyclovir (VALTREX) 1000 MG tablet    Last reviewed on 11/27/2022  1:32 PM by Chapincito Norman MD    Review of patient's allergies indicates:   Allergen Reactions    Sulfa (sulfonamide antibiotics)     Last reviewed on  11/27/2022 1:32 PM by Chapincito Norman      Tasks added this encounter   3/31/2023 - Refill Call (Auto Added)   Tasks due within next 3 months   5/25/2023 - Clinical - Follow Up Assesement (Annual)     Shellie Oliver [facility]  [address] [place] [id number]  Phone: [phone]  Fax: [phone]

## 2023-03-31 ENCOUNTER — PATIENT MESSAGE (OUTPATIENT)
Dept: INFECTIOUS DISEASES | Facility: CLINIC | Age: 58
End: 2023-03-31
Payer: COMMERCIAL

## 2023-03-31 DIAGNOSIS — B20 HUMAN IMMUNODEFICIENCY VIRUS (HIV) DISEASE: ICD-10-CM

## 2023-03-31 RX ORDER — DOLUTEGRAVIR SODIUM AND RILPIVIRINE HYDROCHLORIDE 50; 25 MG/1; MG/1
1 TABLET, FILM COATED ORAL DAILY
Qty: 30 TABLET | Refills: 11 | Status: SHIPPED | OUTPATIENT
Start: 2023-03-31 | End: 2024-04-01 | Stop reason: SDUPTHER

## 2023-04-03 ENCOUNTER — SPECIALTY PHARMACY (OUTPATIENT)
Dept: PHARMACY | Facility: CLINIC | Age: 58
End: 2023-04-03
Payer: COMMERCIAL

## 2023-04-03 ENCOUNTER — PATIENT MESSAGE (OUTPATIENT)
Dept: PHARMACY | Facility: CLINIC | Age: 58
End: 2023-04-03
Payer: COMMERCIAL

## 2023-04-03 NOTE — TELEPHONE ENCOUNTER
Outgoing call regarding Juluca refill. Pt asked for return call on 4/4/23 or he will call us back.

## 2023-04-03 NOTE — TELEPHONE ENCOUNTER
Specialty Pharmacy - Refill Coordination    Specialty Medication Orders Linked to Encounter      Flowsheet Row Most Recent Value   Medication #1 dolutegravir-rilpivirine 50-25 mg (JULUCA) 50-25 mg Tab (Order#[id number], Rx#[id number])            Refill Questions - Documented Responses      Flowsheet Row Most Recent Value   Patient Availability and HIPAA Verification    Does patient want to proceed with activity? Yes   HIPAA/medical authority confirmed? Yes   Relationship to patient of person spoken to? Self   Refill Screening Questions    Changes to allergies? No   Changes to medications? No   New conditions since last clinic visit? No   Unplanned office visit, urgent care, ED, or hospital admission in the last 4 weeks? No   How does patient/caregiver feel medication is working? Good   Financial problems or insurance changes? No   How many doses of your specialty medications were missed in the last 4 weeks? 0   Would patient like to speak to a pharmacist? No   When does the patient need to receive the medication? 04/07/23   Refill Delivery Questions    How will the patient receive the medication? Mail   When does the patient need to receive the medication? 04/07/23   Shipping Address Home   Address in [facility] confirmed and updated if neccessary? Yes   Expected Copay ($) 0   Is the patient able to afford the medication copay? Yes   Payment Method zero copay   Days supply of Refill 30   Supplies needed? No supplies needed   Refill activity completed? Yes   Refill activity plan Refill scheduled   Shipment/Pickup Date: 04/05/23            Current Outpatient Medications   Medication Sig    azithromycin (Z-RITA) 250 MG tablet azithromycin 250 mg tablet   TAKE 2 TABLETS BY MOUTH TODAY, THEN TAKE 1 TABLET DAILY FOR 4 DAYS    brimonidine 0.2% (ALPHAGAN) 0.2 % Drop Place 1 drop into both eyes 2 (two) times daily.    dolutegravir-rilpivirine 50-25 mg (JULUCA) 50-25 mg Tab Take 1 tablet by mouth once daily.     hepatitis B virus vacc.rec,PF, (RECOMBIVAX HB, PF,) 10 mcg/mL Susp Three doses.  Second dose 1 month after first dose.  Third dose 6 months after first.    influenza (FLULAVAL QUADRIVALENT) 60 mcg (15 mcg x 4)/0.5 mL Syrg vaccine Fluarix Quad 2311-7270 (PF) 60 mcg (15 mcg x 4)/0.5 mL IM syringe   TO BE ADMINISTERED BY PHARMACIST FOR IMMUNIZATION    lorazepam (ATIVAN) 0.5 MG tablet Take 1 tablet (0.5 mg total) by mouth every 12 (twelve) hours as needed.    MELATONIN/PYRIDOXINE HCL, B6, (MELATONEX ORAL) Take 5 mg by mouth every evening.    multivitamin with minerals tablet Take 1 tablet by mouth once daily.    omega-3 fatty acids/fish oil (FISH OIL-OMEGA-3 FATTY ACIDS) 300-1,000 mg capsule Take by mouth once daily.    sildenafil (VIAGRA) 100 MG tablet TAKE 1 TABLET BY MOUTH AS NEEDED    traZODone (DESYREL) 50 MG tablet TAKE 1 TABLET (50 MG TOTAL) BY MOUTH AS NEEDED FOR INSOMNIA.    triamcinolone acetonide 0.1% (KENALOG) 0.1 % Lotn Apply to scalp twice daily as needed for scale, redness, or itching    valacyclovir (VALTREX) 1000 MG tablet    Last reviewed on 11/27/2022  1:32 PM by Chapincito Norman MD    Review of patient's allergies indicates:   Allergen Reactions    Sulfa (sulfonamide antibiotics)     Last reviewed on  11/27/2022 1:32 PM by Chapincito Norman      Tasks added this encounter   4/30/2023 - Refill Call (Auto Added)   Tasks due within next 3 months   5/25/2023 - Clinical - Follow Up Assesement (Annual)     Soledad Vincent, PharmD  [facility]  [address] [place] [address]  Phone: [phone]  Fax: [phone]

## 2023-05-01 ENCOUNTER — SPECIALTY PHARMACY (OUTPATIENT)
Dept: PHARMACY | Facility: CLINIC | Age: 58
End: 2023-05-01
Payer: COMMERCIAL

## 2023-05-01 NOTE — TELEPHONE ENCOUNTER
Specialty Pharmacy - Refill Coordination    Specialty Medication Orders Linked to Encounter      Flowsheet Row Most Recent Value   Medication #1 dolutegravir-rilpivirine 50-25 mg (JULUCA) 50-25 mg Tab (Order#[id number], Rx#[id number])            Refill Questions - Documented Responses      Flowsheet Row Most Recent Value   Patient Availability and HIPAA Verification    Does patient want to proceed with activity? Yes   HIPAA/medical authority confirmed? Yes   Relationship to patient of person spoken to? Self   Refill Screening Questions    Changes to allergies? No   Changes to medications? No   New conditions since last clinic visit? No   Unplanned office visit, urgent care, ED, or hospital admission in the last 4 weeks? No   How does patient/caregiver feel medication is working? Good   Financial problems or insurance changes? No   How many doses of your specialty medications were missed in the last 4 weeks? 0   Would patient like to speak to a pharmacist? No   When does the patient need to receive the medication? 05/05/23   Refill Delivery Questions    How will the patient receive the medication? Mail   When does the patient need to receive the medication? 05/05/23   Shipping Address Home   Address in [facility] confirmed and updated if neccessary? Yes   Expected Copay ($) 0   Is the patient able to afford the medication copay? Yes   Payment Method zero copay   Days supply of Refill 30   Refill activity completed? Yes   Refill activity plan Refill scheduled   Shipment/Pickup Date: 05/04/23            Current Outpatient Medications   Medication Sig    azithromycin (Z-RITA) 250 MG tablet azithromycin 250 mg tablet   TAKE 2 TABLETS BY MOUTH TODAY, THEN TAKE 1 TABLET DAILY FOR 4 DAYS    brimonidine 0.2% (ALPHAGAN) 0.2 % Drop Place 1 drop into both eyes 2 (two) times daily.    dolutegravir-rilpivirine 50-25 mg (JULUCA) 50-25 mg Tab Take 1 tablet by mouth once daily.    hepatitis B virus vacc.rec,PF, (RECOMBIVAX HB,  PF,) 10 mcg/mL Susp Three doses.  Second dose 1 month after first dose.  Third dose 6 months after first.    influenza (FLULAVAL QUADRIVALENT) 60 mcg (15 mcg x 4)/0.5 mL Syrg vaccine Fluarix Quad 4736-4475 (PF) 60 mcg (15 mcg x 4)/0.5 mL IM syringe   TO BE ADMINISTERED BY PHARMACIST FOR IMMUNIZATION    lorazepam (ATIVAN) 0.5 MG tablet Take 1 tablet (0.5 mg total) by mouth every 12 (twelve) hours as needed.    MELATONIN/PYRIDOXINE HCL, B6, (MELATONEX ORAL) Take 5 mg by mouth every evening.    multivitamin with minerals tablet Take 1 tablet by mouth once daily.    omega-3 fatty acids/fish oil (FISH OIL-OMEGA-3 FATTY ACIDS) 300-1,000 mg capsule Take by mouth once daily.    sildenafil (VIAGRA) 100 MG tablet TAKE 1 TABLET BY MOUTH AS NEEDED    traZODone (DESYREL) 50 MG tablet TAKE 1 TABLET (50 MG TOTAL) BY MOUTH AS NEEDED FOR INSOMNIA.    triamcinolone acetonide 0.1% (KENALOG) 0.1 % Lotn Apply to scalp twice daily as needed for scale, redness, or itching    valacyclovir (VALTREX) 1000 MG tablet    Last reviewed on 11/27/2022  1:32 PM by Chapincito Norman MD    Review of patient's allergies indicates:   Allergen Reactions    Sulfa (sulfonamide antibiotics)     Last reviewed on  11/27/2022 1:32 PM by Chapincito Norman      Tasks added this encounter   No tasks added.   Tasks due within next 3 months   5/25/2023 - Clinical Assessment (1 year recurrence)     Shellie Lord  [facility]  [address] [place] [address]  Phone: [phone]  Fax: [phone]

## 2023-05-29 ENCOUNTER — SPECIALTY PHARMACY (OUTPATIENT)
Dept: PHARMACY | Facility: CLINIC | Age: 58
End: 2023-05-29
Payer: COMMERCIAL

## 2023-05-29 ENCOUNTER — PATIENT MESSAGE (OUTPATIENT)
Dept: PHARMACY | Facility: CLINIC | Age: 58
End: 2023-05-29
Payer: COMMERCIAL

## 2023-05-29 NOTE — TELEPHONE ENCOUNTER
Specialty Pharmacy - Refill Coordination    Specialty Medication Orders Linked to Encounter      Flowsheet Row Most Recent Value   Medication #1 dolutegravir-rilpivirine 50-25 mg (JULUCA) 50-25 mg Tab (Order#[id number], Rx#[id number])            Refill Questions - Documented Responses      Flowsheet Row Most Recent Value   Refill Screening Questions    Changes to allergies? No   Changes to medications? No   New conditions since last clinic visit? No   Unplanned office visit, urgent care, ED, or hospital admission in the last 4 weeks? No   How does patient/caregiver feel medication is working? Good   Financial problems or insurance changes? No   How many doses of your specialty medications were missed in the last 4 weeks? 0   Would patient like to speak to a pharmacist? No   When does the patient need to receive the medication? 06/05/23   Refill Delivery Questions    How will the patient receive the medication? Mail   When does the patient need to receive the medication? 06/05/23   Shipping Address Home   Address in [facility] confirmed and updated if neccessary? Yes   Expected Copay ($) 0   Is the patient able to afford the medication copay? Yes   Payment Method zero copay   Days supply of Refill 30   Supplies needed? No supplies needed   Refill activity completed? Yes   Refill activity plan Refill scheduled   Shipment/Pickup Date: 06/01/23            Current Outpatient Medications   Medication Sig    azithromycin (Z-RITA) 250 MG tablet azithromycin 250 mg tablet   TAKE 2 TABLETS BY MOUTH TODAY, THEN TAKE 1 TABLET DAILY FOR 4 DAYS    brimonidine 0.2% (ALPHAGAN) 0.2 % Drop Place 1 drop into both eyes 2 (two) times daily.    dolutegravir-rilpivirine 50-25 mg (JULUCA) 50-25 mg Tab Take 1 tablet by mouth once daily.    hepatitis B virus vacc.rec,PF, (RECOMBIVAX HB, PF,) 10 mcg/mL Susp Three doses.  Second dose 1 month after first dose.  Third dose 6 months after first.    influenza (FLULAVAL QUADRIVALENT) 60 mcg  (15 mcg x 4)/0.5 mL Syrg vaccine Fluarix Quad 5098-9666 (PF) 60 mcg (15 mcg x 4)/0.5 mL IM syringe   TO BE ADMINISTERED BY PHARMACIST FOR IMMUNIZATION    lorazepam (ATIVAN) 0.5 MG tablet Take 1 tablet (0.5 mg total) by mouth every 12 (twelve) hours as needed.    MELATONIN/PYRIDOXINE HCL, B6, (MELATONEX ORAL) Take 5 mg by mouth every evening.    multivitamin with minerals tablet Take 1 tablet by mouth once daily.    omega-3 fatty acids/fish oil (FISH OIL-OMEGA-3 FATTY ACIDS) 300-1,000 mg capsule Take by mouth once daily.    sildenafil (VIAGRA) 100 MG tablet TAKE 1 TABLET BY MOUTH AS NEEDED    traZODone (DESYREL) 50 MG tablet TAKE 1 TABLET (50 MG TOTAL) BY MOUTH AS NEEDED FOR INSOMNIA.    triamcinolone acetonide 0.1% (KENALOG) 0.1 % Lotn Apply to scalp twice daily as needed for scale, redness, or itching    valacyclovir (VALTREX) 1000 MG tablet    Last reviewed on 11/27/2022  1:32 PM by Chapincito Norman MD    Review of patient's allergies indicates:   Allergen Reactions    Sulfa (sulfonamide antibiotics)     Last reviewed on  11/27/2022 1:32 PM by Chapincito Norman      Tasks added this encounter   No tasks added.   Tasks due within next 3 months   6/1/2023 - Refill Coordination Outreach (1 time occurrence)     Neeru Cross, PharmD  [facility]  [address] [place] [id number]  Phone: [phone]  Fax: [phone]

## 2023-05-29 NOTE — TELEPHONE ENCOUNTER
Specialty Pharmacy - Clinical Reassessment    Specialty Medication Orders Linked to Encounter      Flowsheet Row Most Recent Value   Medication #1 dolutegravir-rilpivirine 50-25 mg (JULUCA) 50-25 mg Tab (Order#[id number], Rx#[id number])          Patient Diagnosis   B20 - HIV infection    Oscar Gibbs is a 58 y.o. male, who is followed by the specialty pharmacy service for management and education of his HIV regimen.  He has been on therapy with Juluca since 10/2021.  I have reviewed his electronic medical record and current medication list and determined that specialty medication adjustment Is not needed at this time.    Patient has not experienced adverse events.  He Is adherent reporting 0 missed doses since last review.  Adherence has been encouraged with the following mechanism(s): monthly refill calls.  He is meeting goals of therapy and will continue treatment. Renal and hepatic function appropriate for medication continuation. HIV RNA VL NOT detected (10/22/22). CD4 count 1133 cells/uL (10/22/22).         5/1/2023 4/3/2023 3/3/2023 2/1/2023 1/3/2023 12/2/2022 11/3/2022   Follow Up Review   # of missed doses 0 0 0 0 0 0 0   New Medications? No No No No No No No   New Conditions? No No No No No No No   New Allergies? No No No No No No No   Med Effective? Good Good Good Good Good Good Good   Urgent Care? No No No No No No No   Requested Pharmacist? No No No No No No No         Therapy is appropriate to continue.    Therapy is effective: Yes  On scale of 1 to 10, how does patient rank quality of life? (10 - Best): Unable to Assess  Recommendations: none at this time. Patient already aware about DDI with Juluca and MTV from previous counseling completed by [facility].   Review Method: Chart Review    Tasks added this encounter   2/29/2024 - Clinical Assessment (1 year recurrence)   Tasks due within next 3 months   5/27/2023 - Refill Coordination Outreach (1 time occurrence)     Janiya Ceballos, PharmD  [facility]  [facility] [facility] [address]  Phone: [phone]  Fax: [phone]

## 2023-06-26 ENCOUNTER — PATIENT MESSAGE (OUTPATIENT)
Dept: PHARMACY | Facility: CLINIC | Age: 58
End: 2023-06-26
Payer: COMMERCIAL

## 2023-06-29 ENCOUNTER — SPECIALTY PHARMACY (OUTPATIENT)
Dept: PHARMACY | Facility: CLINIC | Age: 58
End: 2023-06-29
Payer: COMMERCIAL

## 2023-06-29 RX ORDER — SILDENAFIL 100 MG/1
TABLET, FILM COATED ORAL
Qty: 2 TABLET | Refills: 6 | Status: SHIPPED | OUTPATIENT
Start: 2023-06-29

## 2023-06-29 NOTE — TELEPHONE ENCOUNTER
Specialty Pharmacy - Refill Coordination  Specialty Pharmacy - Clinical Intervention    Specialty Medication Orders Linked to Encounter      Flowsheet Row Most Recent Value   Medication #1 dolutegravir-rilpivirine 50-25 mg (JULUCA) 50-25 mg Tab (Order#[id number], Rx#[id number])          Contacted patient for Juluca refill and reassessment completion. Patient is adherent to therapy with no issues or concerns regarding side effects. QOL of life is good. Med rec complete - he remembers counseling regarding MTV DDI (uluca should be administered 4 hours before or 6 hours after taking multivitamin. Alternatively, Juluca and supplements containing calcium or iron can be taken together with food). No other questions or concerns at this time.     Refill Questions - Documented Responses      Flowsheet Row Most Recent Value   Patient Availability and HIPAA Verification    Does patient want to proceed with activity? Yes   HIPAA/medical authority confirmed? Yes   Relationship to patient of person spoken to? Self   Refill Screening Questions    Changes to allergies? No   Changes to medications? No   New conditions since last clinic visit? No   Unplanned office visit, urgent care, ED, or hospital admission in the last 4 weeks? No   How does patient/caregiver feel medication is working? Good   Financial problems or insurance changes? No   How many doses of your specialty medications were missed in the last 4 weeks? 0   When does the patient need to receive the medication? 07/06/23   Refill Delivery Questions    How will the patient receive the medication? Mail   When does the patient need to receive the medication? 07/06/23   Shipping Address Home   Address in [facility] confirmed and updated if neccessary? Yes   Expected Copay ($) 0   Is the patient able to afford the medication copay? Yes   Payment Method zero copay   Days supply of Refill 30   Supplies needed? No supplies needed   Refill activity completed? Yes   Refill  activity plan Refill scheduled   Shipment/Pickup Date: 07/05/23            Current Outpatient Medications   Medication Sig    dolutegravir-rilpivirine 50-25 mg (JULUCA) 50-25 mg Tab Take 1 tablet by mouth once daily.    latanoprost 0.005 % dpem Apply to eye.    lorazepam (ATIVAN) 0.5 MG tablet Take 1 tablet (0.5 mg total) by mouth every 12 (twelve) hours as needed.    MELATONIN/PYRIDOXINE HCL, B6, (MELATONEX ORAL) Take 5 mg by mouth every evening.    multivitamin with minerals tablet Take 1 tablet by mouth once daily.    omega-3 fatty acids/fish oil (FISH OIL-OMEGA-3 FATTY ACIDS) 300-1,000 mg capsule Take by mouth once daily.    sildenafil (VIAGRA) 100 MG tablet TAKE 1 TABLET BY MOUTH AS NEEDED    traZODone (DESYREL) 50 MG tablet TAKE 1 TABLET (50 MG TOTAL) BY MOUTH AS NEEDED FOR INSOMNIA.    triamcinolone acetonide 0.1% (KENALOG) 0.1 % Lotn Apply to scalp twice daily as needed for scale, redness, or itching    valacyclovir (VALTREX) 1000 MG tablet     hepatitis B virus vacc.rec,PF, (RECOMBIVAX HB, PF,) 10 mcg/mL Susp Three doses.  Second dose 1 month after first dose.  Third dose 6 months after first.    influenza (FLULAVAL QUADRIVALENT) 60 mcg (15 mcg x 4)/0.5 mL Syrg vaccine Fluarix Quad 5523-0674 (PF) 60 mcg (15 mcg x 4)/0.5 mL IM syringe   TO BE ADMINISTERED BY PHARMACIST FOR IMMUNIZATION   Last reviewed on 6/29/2023 10:26 AM by Janiya Ceballos, Eleanor    Review of patient's allergies indicates:   Allergen Reactions    Sulfa (sulfonamide antibiotics)     Last reviewed on  11/27/2022 1:32 PM by Chapincito Norman    Tasks added this encounter   No tasks added.   Tasks due within next 3 months   No tasks due.     Janiya Ceballso, Eleanor  [facility]  [address] [place] [address]  Phone: [phone]  Fax: [phone]

## 2023-07-21 ENCOUNTER — LAB VISIT (OUTPATIENT)
Dept: LAB | Facility: HOSPITAL | Age: 58
End: 2023-07-21
Attending: INTERNAL MEDICINE
Payer: COMMERCIAL

## 2023-07-21 DIAGNOSIS — B20 HUMAN IMMUNODEFICIENCY VIRUS (HIV) DISEASE: ICD-10-CM

## 2023-07-21 LAB
ALBUMIN SERPL BCP-MCNC: 4.1 G/DL (ref 3.5–5.2)
ALP SERPL-CCNC: 52 U/L (ref 55–135)
ALT SERPL W/O P-5'-P-CCNC: 21 U/L (ref 10–44)
ANION GAP SERPL CALC-SCNC: 11 MMOL/L (ref 8–16)
AST SERPL-CCNC: 26 U/L (ref 10–40)
BASOPHILS # BLD AUTO: 0.03 K/UL (ref 0–0.2)
BASOPHILS NFR BLD: 0.4 % (ref 0–1.9)
BILIRUB SERPL-MCNC: 0.6 MG/DL (ref 0.1–1)
BUN SERPL-MCNC: 15 MG/DL (ref 6–20)
CALCIUM SERPL-MCNC: 9.2 MG/DL (ref 8.7–10.5)
CHLORIDE SERPL-SCNC: 105 MMOL/L (ref 95–110)
CO2 SERPL-SCNC: 24 MMOL/L (ref 23–29)
COMPLEXED PSA SERPL-MCNC: 0.15 NG/ML (ref 0–4)
CREAT SERPL-MCNC: 1 MG/DL (ref 0.5–1.4)
DIFFERENTIAL METHOD: ABNORMAL
EOSINOPHIL # BLD AUTO: 0.1 K/UL (ref 0–0.5)
EOSINOPHIL NFR BLD: 1.8 % (ref 0–8)
ERYTHROCYTE [DISTWIDTH] IN BLOOD BY AUTOMATED COUNT: 12.7 % (ref 11.5–14.5)
EST. GFR  (NO RACE VARIABLE): >60 ML/MIN/1.73 M^2
GLUCOSE SERPL-MCNC: 148 MG/DL (ref 70–110)
HCT VFR BLD AUTO: 41.4 % (ref 40–54)
HGB BLD-MCNC: 14.5 G/DL (ref 14–18)
IMM GRANULOCYTES # BLD AUTO: 0.01 K/UL (ref 0–0.04)
IMM GRANULOCYTES NFR BLD AUTO: 0.1 % (ref 0–0.5)
LYMPHOCYTES # BLD AUTO: 2.4 K/UL (ref 1–4.8)
LYMPHOCYTES NFR BLD: 34.3 % (ref 18–48)
MCH RBC QN AUTO: 31.2 PG (ref 27–31)
MCHC RBC AUTO-ENTMCNC: 35 G/DL (ref 32–36)
MCV RBC AUTO: 89 FL (ref 82–98)
MONOCYTES # BLD AUTO: 0.5 K/UL (ref 0.3–1)
MONOCYTES NFR BLD: 6.7 % (ref 4–15)
NEUTROPHILS # BLD AUTO: 4 K/UL (ref 1.8–7.7)
NEUTROPHILS NFR BLD: 56.7 % (ref 38–73)
NRBC BLD-RTO: 0 /100 WBC
PLATELET # BLD AUTO: 205 K/UL (ref 150–450)
PMV BLD AUTO: 10.8 FL (ref 9.2–12.9)
POTASSIUM SERPL-SCNC: 3.7 MMOL/L (ref 3.5–5.1)
PROT SERPL-MCNC: 6.9 G/DL (ref 6–8.4)
RBC # BLD AUTO: 4.65 M/UL (ref 4.6–6.2)
SODIUM SERPL-SCNC: 140 MMOL/L (ref 136–145)
WBC # BLD AUTO: 7.06 K/UL (ref 3.9–12.7)

## 2023-07-21 PROCEDURE — 86361 T CELL ABSOLUTE COUNT: CPT | Performed by: INTERNAL MEDICINE

## 2023-07-21 PROCEDURE — 36415 COLL VENOUS BLD VENIPUNCTURE: CPT | Mod: PO | Performed by: INTERNAL MEDICINE

## 2023-07-21 PROCEDURE — 84153 ASSAY OF PSA TOTAL: CPT | Performed by: INTERNAL MEDICINE

## 2023-07-21 PROCEDURE — 86592 SYPHILIS TEST NON-TREP QUAL: CPT | Performed by: INTERNAL MEDICINE

## 2023-07-21 PROCEDURE — 80053 COMPREHEN METABOLIC PANEL: CPT | Performed by: INTERNAL MEDICINE

## 2023-07-21 PROCEDURE — 87536 HIV-1 QUANT&REVRSE TRNSCRPJ: CPT | Performed by: INTERNAL MEDICINE

## 2023-07-21 PROCEDURE — 85025 COMPLETE CBC W/AUTO DIFF WBC: CPT | Performed by: INTERNAL MEDICINE

## 2023-07-22 LAB — RPR SER QL: NORMAL

## 2023-07-24 LAB
CD3+CD4+ CELLS # BLD: 1052 CELLS/UL (ref 300–1400)
CD3+CD4+ CELLS NFR BLD: 40.6 % (ref 28–57)

## 2023-07-26 LAB
HIV1 RNA # SERPL NAA+PROBE: NOT DETECTED COPIES/ML
HIV1 RNA SERPL QL NAA+PROBE: NOT DETECTED

## 2023-07-28 ENCOUNTER — SPECIALTY PHARMACY (OUTPATIENT)
Dept: PHARMACY | Facility: CLINIC | Age: 58
End: 2023-07-28
Payer: COMMERCIAL

## 2023-07-28 NOTE — TELEPHONE ENCOUNTER
Specialty Pharmacy - Refill Coordination    Specialty Medication Orders Linked to Encounter      Flowsheet Row Most Recent Value   Medication #1 dolutegravir-rilpivirine 50-25 mg (JULUCA) 50-25 mg Tab (Order#[id number], Rx#[id number])            Refill Questions - Documented Responses      Flowsheet Row Most Recent Value   Patient Availability and HIPAA Verification    Does patient want to proceed with activity? Yes   HIPAA/medical authority confirmed? Yes   Relationship to patient of person spoken to? Self   Refill Screening Questions    Changes to allergies? No   Changes to medications? No   New conditions since last clinic visit? No   Unplanned office visit, urgent care, ED, or hospital admission in the last 4 weeks? No   How does patient/caregiver feel medication is working? Good   Financial problems or insurance changes? No   How many doses of your specialty medications were missed in the last 4 weeks? 0   Would patient like to speak to a pharmacist? No   When does the patient need to receive the medication? 08/05/23   Refill Delivery Questions    How will the patient receive the medication? Mail   When does the patient need to receive the medication? 08/05/23   Shipping Address Home   Address in [facility] confirmed and updated if neccessary? Yes   Expected Copay ($) 0   Is the patient able to afford the medication copay? Yes   Payment Method zero copay   Days supply of Refill 30   Refill activity completed? Yes   Refill activity plan Refill scheduled   Shipment/Pickup Date: 08/03/23            Current Outpatient Medications   Medication Sig    dolutegravir-rilpivirine 50-25 mg (JULUCA) 50-25 mg Tab Take 1 tablet by mouth once daily.    hepatitis B virus vacc.rec,PF, (RECOMBIVAX HB, PF,) 10 mcg/mL Susp Three doses.  Second dose 1 month after first dose.  Third dose 6 months after first.    influenza (FLULAVAL QUADRIVALENT) 60 mcg (15 mcg x 4)/0.5 mL Syrg vaccine Fluarix Quad 9333-2523 (PF) 60 mcg (15  mcg x 4)/0.5 mL IM syringe   TO BE ADMINISTERED BY PHARMACIST FOR IMMUNIZATION    latanoprost 0.005 % dpem Apply to eye.    lorazepam (ATIVAN) 0.5 MG tablet Take 1 tablet (0.5 mg total) by mouth every 12 (twelve) hours as needed.    MELATONIN/PYRIDOXINE HCL, B6, (MELATONEX ORAL) Take 5 mg by mouth every evening.    multivitamin with minerals tablet Take 1 tablet by mouth once daily.    omega-3 fatty acids/fish oil (FISH OIL-OMEGA-3 FATTY ACIDS) 300-1,000 mg capsule Take by mouth once daily.    sildenafiL (VIAGRA) 100 MG tablet TAKE 1 TABLET BY MOUTH AS NEEDED    traZODone (DESYREL) 50 MG tablet TAKE 1 TABLET (50 MG TOTAL) BY MOUTH AS NEEDED FOR INSOMNIA.    triamcinolone acetonide 0.1% (KENALOG) 0.1 % Lotn Apply to scalp twice daily as needed for scale, redness, or itching    valacyclovir (VALTREX) 1000 MG tablet    Last reviewed on 6/29/2023 10:26 AM by Janiya Ceballos, PharmD    Review of patient's allergies indicates:   Allergen Reactions    Sulfa (sulfonamide antibiotics)     Last reviewed on  11/27/2022 1:32 PM by Chapincito Norman      Tasks added this encounter   No tasks added.   Tasks due within next 3 months   No tasks due.     Shellie Oliver [facility]  [address] [place] [id number]  Phone: [phone]  Fax: [phone]

## 2023-08-04 ENCOUNTER — OFFICE VISIT (OUTPATIENT)
Dept: INFECTIOUS DISEASES | Facility: CLINIC | Age: 58
End: 2023-08-04
Payer: COMMERCIAL

## 2023-08-04 VITALS
HEART RATE: 55 BPM | WEIGHT: 170.63 LBS | BODY MASS INDEX: 23.11 KG/M2 | SYSTOLIC BLOOD PRESSURE: 128 MMHG | DIASTOLIC BLOOD PRESSURE: 67 MMHG | TEMPERATURE: 98 F | HEIGHT: 72 IN

## 2023-08-04 DIAGNOSIS — Z23 IMMUNIZATION DUE: ICD-10-CM

## 2023-08-04 DIAGNOSIS — B20 HUMAN IMMUNODEFICIENCY VIRUS (HIV) DISEASE: Primary | ICD-10-CM

## 2023-08-04 PROCEDURE — 3078F PR MOST RECENT DIASTOLIC BLOOD PRESSURE < 80 MM HG: ICD-10-PCS | Mod: CPTII,S$GLB,, | Performed by: INTERNAL MEDICINE

## 2023-08-04 PROCEDURE — 99214 OFFICE O/P EST MOD 30 MIN: CPT | Mod: S$GLB,,, | Performed by: INTERNAL MEDICINE

## 2023-08-04 PROCEDURE — 3074F PR MOST RECENT SYSTOLIC BLOOD PRESSURE < 130 MM HG: ICD-10-PCS | Mod: CPTII,S$GLB,, | Performed by: INTERNAL MEDICINE

## 2023-08-04 PROCEDURE — 3074F SYST BP LT 130 MM HG: CPT | Mod: CPTII,S$GLB,, | Performed by: INTERNAL MEDICINE

## 2023-08-04 PROCEDURE — 1160F RVW MEDS BY RX/DR IN RCRD: CPT | Mod: CPTII,S$GLB,, | Performed by: INTERNAL MEDICINE

## 2023-08-04 PROCEDURE — 1159F PR MEDICATION LIST DOCUMENTED IN MEDICAL RECORD: ICD-10-PCS | Mod: CPTII,S$GLB,, | Performed by: INTERNAL MEDICINE

## 2023-08-04 PROCEDURE — 3078F DIAST BP <80 MM HG: CPT | Mod: CPTII,S$GLB,, | Performed by: INTERNAL MEDICINE

## 2023-08-04 PROCEDURE — 99214 PR OFFICE/OUTPT VISIT, EST, LEVL IV, 30-39 MIN: ICD-10-PCS | Mod: S$GLB,,, | Performed by: INTERNAL MEDICINE

## 2023-08-04 PROCEDURE — 99999 PR PBB SHADOW E&M-EST. PATIENT-LVL IV: ICD-10-PCS | Mod: PBBFAC,,, | Performed by: INTERNAL MEDICINE

## 2023-08-04 PROCEDURE — 3008F PR BODY MASS INDEX (BMI) DOCUMENTED: ICD-10-PCS | Mod: CPTII,S$GLB,, | Performed by: INTERNAL MEDICINE

## 2023-08-04 PROCEDURE — 1159F MED LIST DOCD IN RCRD: CPT | Mod: CPTII,S$GLB,, | Performed by: INTERNAL MEDICINE

## 2023-08-04 PROCEDURE — 99999 PR PBB SHADOW E&M-EST. PATIENT-LVL IV: CPT | Mod: PBBFAC,,, | Performed by: INTERNAL MEDICINE

## 2023-08-04 PROCEDURE — 3008F BODY MASS INDEX DOCD: CPT | Mod: CPTII,S$GLB,, | Performed by: INTERNAL MEDICINE

## 2023-08-04 PROCEDURE — 1160F PR REVIEW ALL MEDS BY PRESCRIBER/CLIN PHARMACIST DOCUMENTED: ICD-10-PCS | Mod: CPTII,S$GLB,, | Performed by: INTERNAL MEDICINE

## 2023-08-04 RX ORDER — POLYETHYLENE GLYCOL 400 AND PROPYLENE GLYCOL 4; 3 MG/ML; MG/ML
1 SOLUTION/ DROPS OPHTHALMIC 2 TIMES DAILY
COMMUNITY
Start: 2023-06-29

## 2023-08-04 RX ORDER — CETIRIZINE HYDROCHLORIDE 10 MG/1
TABLET ORAL
COMMUNITY

## 2023-08-04 RX ORDER — TENOFOVIR DISOPROXIL FUMARATE 300 MG/1
1 TABLET, FILM COATED ORAL DAILY
COMMUNITY
End: 2023-08-04

## 2023-08-04 RX ORDER — KETOCONAZOLE 20 MG/ML
SHAMPOO, SUSPENSION TOPICAL
COMMUNITY
Start: 2023-02-14

## 2023-08-04 RX ORDER — ZIDOVUDINE 300 MG/1
300 TABLET ORAL 2 TIMES DAILY
COMMUNITY
End: 2023-08-04

## 2023-08-04 NOTE — PROGRESS NOTES
Subjective:      Patient ID: Oscar Gibbs is a 58 y.o. male.    Chief Complaint:HIV Positive/AIDS      History of Present Illness    59 y/o male with a history of HIV diagnosed in .  He was on tenofovir, zidovidune and kaletra.  He was switched to juluca and has remained stable.  He is here for follow up.     Interval history          Medical History  Basal cell cancer  Cataracts  Glaucoma  Colonoscopy X 2 in the past most recent in 2020  HIV diagnosed in     Medications  Juluca  Scalp shampoo  Prescription eyedrop for redness in eyes  MVI  Fish oil  Riboflavinoid for ear ringing.  Cysteine eye drops  Trazadone  Melatonin.    Surgical History  Lasik surgery     Social History  Born and raised in [place], [place].  Works as a .  Father is .  Mother is 93.  Oldest brother is .  Middle brother is a physician.  Started smoking in his mid twenties.  Smoked about 1/2 ppd.  Quit smoking in . Drinks about 2 glasses of wine per day with diner.  No illicits.   He has been in a committed monogamous relationship with his male partner.            Immunizations  COVID-19 pfizer vaccine X 2 doses (last dose march 15, 2021).  Prevnar 2013  Pneumovax 2018  Tdap 2017  Shingrix vaccine X 2 2019      Family History  Older brother had liver transplant at [facility].  Had organ rejection.    Middle brother is a physician.         Review of Systems   HENT:  Positive for tinnitus.    Eyes:  Positive for redness.   Objective:   Physical Exam  Vitals and nursing note reviewed.   Constitutional:       General: He is not in acute distress.     Appearance: He is well-developed. He is not diaphoretic.   HENT:      Head: Normocephalic and atraumatic.      Right Ear: External ear normal.      Left Ear: External ear normal.      Nose: Nose normal.      Mouth/Throat:      Pharynx: No oropharyngeal exudate.   Eyes:      General: No scleral icterus.        Right eye: No discharge.         Left eye: No  discharge.      Conjunctiva/sclera: Conjunctivae normal.      Pupils: Pupils are equal, round, and reactive to light.   Neck:      Thyroid: No thyromegaly.      Vascular: No JVD.      Trachea: No tracheal deviation.   Cardiovascular:      Rate and Rhythm: Normal rate and regular rhythm.      Heart sounds: No murmur heard.     No friction rub. No gallop.   Pulmonary:      Effort: Pulmonary effort is normal. No respiratory distress.      Breath sounds: Normal breath sounds. No stridor. No wheezing or rales.   Chest:      Chest wall: No tenderness.   Abdominal:      General: Bowel sounds are normal. There is no distension.      Palpations: Abdomen is soft. There is no mass.      Tenderness: There is no abdominal tenderness. There is no guarding or rebound.   Musculoskeletal:         General: No tenderness. Normal range of motion.      Cervical back: Normal range of motion and neck supple.   Lymphadenopathy:      Cervical: No cervical adenopathy.   Skin:     General: Skin is warm.      Coloration: Skin is not pale.      Findings: No erythema or rash.   Neurological:      Mental Status: He is alert and oriented to person, place, and time.      Cranial Nerves: No cranial nerve deficit.      Motor: No abnormal muscle tone.      Coordination: Coordination normal.      Deep Tendon Reflexes: Reflexes are normal and symmetric. Reflexes normal.   Psychiatric:         Behavior: Behavior normal.         Thought Content: Thought content normal.         Judgment: Judgment normal.       Assessment:     1. Human immunodeficiency virus (HIV) disease :  Labs reviewed.  Viral load is suppressed.  Continue Juluca.  Will arrange for fasting lipids when patient is fasting.   2. Immunization due :  Patient advised to get the bivalent covid booster.       Plan:      Human immunodeficiency virus (HIV) disease   - continue juluca    Immunization due   - bivalent covid booster via his local pharmacy.

## 2023-10-01 DIAGNOSIS — F51.01 PRIMARY INSOMNIA: ICD-10-CM

## 2023-10-02 RX ORDER — TRAZODONE HYDROCHLORIDE 50 MG/1
TABLET ORAL
Qty: 30 TABLET | Refills: 3 | Status: SHIPPED | OUTPATIENT
Start: 2023-10-02

## 2024-02-27 ENCOUNTER — TELEPHONE (OUTPATIENT)
Dept: INFECTIOUS DISEASES | Facility: CLINIC | Age: 59
End: 2024-02-27
Payer: COMMERCIAL

## 2024-02-27 DIAGNOSIS — B20 HUMAN IMMUNODEFICIENCY VIRUS (HIV) DISEASE: Primary | ICD-10-CM

## 2024-02-27 DIAGNOSIS — R73.9 HYPERGLYCEMIA: ICD-10-CM

## 2024-02-27 NOTE — TELEPHONE ENCOUNTER
----- Message from Jannette Barajas sent at 2/27/2024 11:37 AM CST -----  Regarding: apt  Contact: [phone]  Pt calling  in requesting call back regarding apt on 3/ 25/24 pt is needing a Friday apt and pt is needing blood work please call to discuss further

## 2024-03-01 ENCOUNTER — PATIENT MESSAGE (OUTPATIENT)
Dept: INFECTIOUS DISEASES | Facility: CLINIC | Age: 59
End: 2024-03-01
Payer: COMMERCIAL

## 2024-03-02 ENCOUNTER — LAB VISIT (OUTPATIENT)
Dept: LAB | Facility: HOSPITAL | Age: 59
End: 2024-03-02
Attending: INTERNAL MEDICINE
Payer: COMMERCIAL

## 2024-03-02 ENCOUNTER — LAB VISIT (OUTPATIENT)
Dept: LAB | Facility: HOSPITAL | Age: 59
End: 2024-03-02
Payer: COMMERCIAL

## 2024-03-02 DIAGNOSIS — B20 HUMAN IMMUNODEFICIENCY VIRUS (HIV) DISEASE: ICD-10-CM

## 2024-03-02 DIAGNOSIS — R73.9 HYPERGLYCEMIA: ICD-10-CM

## 2024-03-02 LAB
ALBUMIN SERPL BCP-MCNC: 4.2 G/DL (ref 3.5–5.2)
ALP SERPL-CCNC: 58 U/L (ref 55–135)
ALT SERPL W/O P-5'-P-CCNC: 34 U/L (ref 10–44)
ANION GAP SERPL CALC-SCNC: 8 MMOL/L (ref 8–16)
AST SERPL-CCNC: 27 U/L (ref 10–40)
BACTERIA #/AREA URNS HPF: NORMAL /HPF
BASOPHILS # BLD AUTO: 0.06 K/UL (ref 0–0.2)
BASOPHILS NFR BLD: 1 % (ref 0–1.9)
BILIRUB SERPL-MCNC: 0.8 MG/DL (ref 0.1–1)
BILIRUB UR QL STRIP: NEGATIVE
BUN SERPL-MCNC: 14 MG/DL (ref 6–20)
CALCIUM SERPL-MCNC: 9.7 MG/DL (ref 8.7–10.5)
CHLORIDE SERPL-SCNC: 106 MMOL/L (ref 95–110)
CHOLEST SERPL-MCNC: 225 MG/DL (ref 120–199)
CHOLEST/HDLC SERPL: 3.8 {RATIO} (ref 2–5)
CLARITY UR: CLEAR
CO2 SERPL-SCNC: 27 MMOL/L (ref 23–29)
COLOR UR: YELLOW
CREAT SERPL-MCNC: 1.1 MG/DL (ref 0.5–1.4)
DIFFERENTIAL METHOD BLD: ABNORMAL
EOSINOPHIL # BLD AUTO: 0.2 K/UL (ref 0–0.5)
EOSINOPHIL NFR BLD: 2.6 % (ref 0–8)
ERYTHROCYTE [DISTWIDTH] IN BLOOD BY AUTOMATED COUNT: 12 % (ref 11.5–14.5)
EST. GFR  (NO RACE VARIABLE): >60 ML/MIN/1.73 M^2
GLUCOSE SERPL-MCNC: 93 MG/DL (ref 70–110)
GLUCOSE UR QL STRIP: NEGATIVE
HCT VFR BLD AUTO: 45.1 % (ref 40–54)
HDLC SERPL-MCNC: 60 MG/DL (ref 40–75)
HDLC SERPL: 26.7 % (ref 20–50)
HGB BLD-MCNC: 15.2 G/DL (ref 14–18)
HGB UR QL STRIP: NEGATIVE
IMM GRANULOCYTES # BLD AUTO: 0.01 K/UL (ref 0–0.04)
IMM GRANULOCYTES NFR BLD AUTO: 0.2 % (ref 0–0.5)
KETONES UR QL STRIP: NEGATIVE
LDLC SERPL CALC-MCNC: 150.6 MG/DL (ref 63–159)
LEUKOCYTE ESTERASE UR QL STRIP: ABNORMAL
LYMPHOCYTES # BLD AUTO: 2.9 K/UL (ref 1–4.8)
LYMPHOCYTES NFR BLD: 45.7 % (ref 18–48)
MCH RBC QN AUTO: 31.4 PG (ref 27–31)
MCHC RBC AUTO-ENTMCNC: 33.7 G/DL (ref 32–36)
MCV RBC AUTO: 93 FL (ref 82–98)
MICROSCOPIC COMMENT: NORMAL
MONOCYTES # BLD AUTO: 0.5 K/UL (ref 0.3–1)
MONOCYTES NFR BLD: 8.5 % (ref 4–15)
NEUTROPHILS # BLD AUTO: 2.6 K/UL (ref 1.8–7.7)
NEUTROPHILS NFR BLD: 42 % (ref 38–73)
NITRITE UR QL STRIP: NEGATIVE
NONHDLC SERPL-MCNC: 165 MG/DL
NRBC BLD-RTO: 0 /100 WBC
PH UR STRIP: 6 [PH] (ref 5–8)
PLATELET # BLD AUTO: 220 K/UL (ref 150–450)
PMV BLD AUTO: 10 FL (ref 9.2–12.9)
POTASSIUM SERPL-SCNC: 4.3 MMOL/L (ref 3.5–5.1)
PROT SERPL-MCNC: 7.4 G/DL (ref 6–8.4)
PROT UR QL STRIP: ABNORMAL
RBC # BLD AUTO: 4.84 M/UL (ref 4.6–6.2)
RBC #/AREA URNS HPF: 1 /HPF (ref 0–4)
SODIUM SERPL-SCNC: 141 MMOL/L (ref 136–145)
SP GR UR STRIP: 1.03 (ref 1–1.03)
TRIGL SERPL-MCNC: 72 MG/DL (ref 30–150)
URN SPEC COLLECT METH UR: ABNORMAL
UROBILINOGEN UR STRIP-ACNC: NEGATIVE EU/DL
WBC # BLD AUTO: 6.23 K/UL (ref 3.9–12.7)
WBC #/AREA URNS HPF: 3 /HPF (ref 0–5)

## 2024-03-02 PROCEDURE — 85025 COMPLETE CBC W/AUTO DIFF WBC: CPT | Performed by: INTERNAL MEDICINE

## 2024-03-02 PROCEDURE — 87536 HIV-1 QUANT&REVRSE TRNSCRPJ: CPT | Performed by: INTERNAL MEDICINE

## 2024-03-02 PROCEDURE — 80061 LIPID PANEL: CPT | Performed by: INTERNAL MEDICINE

## 2024-03-02 PROCEDURE — 36415 COLL VENOUS BLD VENIPUNCTURE: CPT | Performed by: INTERNAL MEDICINE

## 2024-03-02 PROCEDURE — 80053 COMPREHEN METABOLIC PANEL: CPT | Performed by: INTERNAL MEDICINE

## 2024-03-02 PROCEDURE — 87591 N.GONORRHOEAE DNA AMP PROB: CPT | Performed by: INTERNAL MEDICINE

## 2024-03-02 PROCEDURE — 86592 SYPHILIS TEST NON-TREP QUAL: CPT | Performed by: INTERNAL MEDICINE

## 2024-03-02 PROCEDURE — 81000 URINALYSIS NONAUTO W/SCOPE: CPT | Performed by: INTERNAL MEDICINE

## 2024-03-02 PROCEDURE — 83036 HEMOGLOBIN GLYCOSYLATED A1C: CPT | Performed by: INTERNAL MEDICINE

## 2024-03-03 LAB
ESTIMATED AVG GLUCOSE: 97 MG/DL (ref 68–131)
HBA1C MFR BLD: 5 % (ref 4.5–6.2)

## 2024-03-04 LAB — RPR SER QL: NORMAL

## 2024-03-05 LAB — HIV1 RNA # PLAS NAA DL=20: NORMAL COPIES/ML

## 2024-03-06 LAB
C TRACH RRNA SPEC QL NAA+PROBE: NEGATIVE
N GONORRHOEA RRNA SPEC QL NAA+PROBE: NEGATIVE
N.GONORROHEAE, AMP RNA SOURCE: NORMAL
SPECIMEN SOURCE: NORMAL

## 2024-03-22 ENCOUNTER — OFFICE VISIT (OUTPATIENT)
Dept: INFECTIOUS DISEASES | Facility: CLINIC | Age: 59
End: 2024-03-22
Payer: COMMERCIAL

## 2024-03-22 VITALS
HEART RATE: 56 BPM | DIASTOLIC BLOOD PRESSURE: 71 MMHG | SYSTOLIC BLOOD PRESSURE: 119 MMHG | WEIGHT: 175.06 LBS | HEIGHT: 72 IN | TEMPERATURE: 98 F | BODY MASS INDEX: 23.71 KG/M2

## 2024-03-22 DIAGNOSIS — F51.01 PRIMARY INSOMNIA: ICD-10-CM

## 2024-03-22 DIAGNOSIS — B20 HUMAN IMMUNODEFICIENCY VIRUS (HIV) DISEASE: Primary | ICD-10-CM

## 2024-03-22 DIAGNOSIS — E78.2 MIXED HYPERLIPIDEMIA: ICD-10-CM

## 2024-03-22 PROCEDURE — 3008F BODY MASS INDEX DOCD: CPT | Mod: CPTII,S$GLB,, | Performed by: INTERNAL MEDICINE

## 2024-03-22 PROCEDURE — 99215 OFFICE O/P EST HI 40 MIN: CPT | Mod: S$GLB,,, | Performed by: INTERNAL MEDICINE

## 2024-03-22 PROCEDURE — 99999 PR PBB SHADOW E&M-EST. PATIENT-LVL IV: CPT | Mod: PBBFAC,,, | Performed by: INTERNAL MEDICINE

## 2024-03-22 PROCEDURE — 1159F MED LIST DOCD IN RCRD: CPT | Mod: CPTII,S$GLB,, | Performed by: INTERNAL MEDICINE

## 2024-03-22 PROCEDURE — 3074F SYST BP LT 130 MM HG: CPT | Mod: CPTII,S$GLB,, | Performed by: INTERNAL MEDICINE

## 2024-03-22 PROCEDURE — 3044F HG A1C LEVEL LT 7.0%: CPT | Mod: CPTII,S$GLB,, | Performed by: INTERNAL MEDICINE

## 2024-03-22 PROCEDURE — 1160F RVW MEDS BY RX/DR IN RCRD: CPT | Mod: CPTII,S$GLB,, | Performed by: INTERNAL MEDICINE

## 2024-03-22 PROCEDURE — 3078F DIAST BP <80 MM HG: CPT | Mod: CPTII,S$GLB,, | Performed by: INTERNAL MEDICINE

## 2024-03-22 NOTE — PROGRESS NOTES
Subjective:      Patient ID: Oscar Gibbs is a 58 y.o. male.    Chief Complaint:Follow-up and HIV Positive/AIDS      History of Present Illness    59 y/o male with a history of HIV diagnosed in .  He was on tenofovir, zidovidune and kaletra.  He was switched to juluca and has remained stable.  He is here for follow up.     Interval history  He denies any problems today.     Medical History  Basal cell cancer  Cataracts  Glaucoma  Colonoscopy X 2 in the past most recent in 2020  HIV diagnosed in      Medications  Juluca  Scalp shampoo  Prescription eyedrop for redness in eyes  MVI  Fish oil  Riboflavinoid for ear ringing.  Cysteine eye drops  Trazadone  Melatonin.     Surgical History  Lasik surgery    Family History  Older brother had liver transplant at [facility].  Had organ rejection.    Middle brother is a physician.  Father had leukemia  Brother had liver cancer  Uncle had prostate cancer  Father and Aunt had skin cancer  Mother is 94 years old.     Social History  Born and raised in [place], [place].  Works as a .  Father is .  Mother is 93.  Oldest brother is .  Middle brother is a physician.  Started smoking in his mid twenties.  Smoked about 1/2 ppd.  Quit smoking in . Drinks about 2 glasses of wine per day with diner.  No illicits.   He has been in a committed monogamous relationship with his male partner.            Immunizations  COVID-19 pfizer vaccine X 2 doses (last dose march 15, 2021).  Prevnar 2013  Pneumovax 2018  Tdap 2017  Shingrix vaccine X 2 2019        Review of Systems   Constitutional: Negative for chills, decreased appetite, fever, malaise/fatigue, night sweats, weight gain and weight loss.   HENT:  Positive for tinnitus. Negative for congestion, ear pain, hearing loss, hoarse voice and sore throat.    Eyes:  Negative for blurred vision, redness and visual disturbance.   Cardiovascular:  Negative for chest pain, leg swelling and palpitations.    Respiratory:  Negative for cough, hemoptysis, shortness of breath, sputum production and wheezing.    Hematologic/Lymphatic: Negative for adenopathy. Does not bruise/bleed easily.   Skin:  Negative for dry skin, itching, rash and suspicious lesions.   Musculoskeletal:  Negative for back pain, joint pain, myalgias and neck pain.   Gastrointestinal:  Negative for abdominal pain, constipation, diarrhea, heartburn, nausea and vomiting.   Genitourinary:  Negative for dysuria, flank pain, frequency, hematuria, hesitancy and urgency.   Neurological:  Negative for dizziness, headaches, numbness, paresthesias and weakness.   Psychiatric/Behavioral:  Negative for depression and memory loss. The patient has insomnia. The patient is not nervous/anxious.    Objective:   Physical Exam  Vitals and nursing note reviewed.   Constitutional:       General: He is not in acute distress.     Appearance: He is well-developed. He is not diaphoretic.   HENT:      Head: Normocephalic and atraumatic.      Right Ear: External ear normal.      Left Ear: External ear normal.      Nose: Nose normal.      Mouth/Throat:      Pharynx: No oropharyngeal exudate.   Eyes:      General: No scleral icterus.        Right eye: No discharge.         Left eye: No discharge.      Conjunctiva/sclera: Conjunctivae normal.      Pupils: Pupils are equal, round, and reactive to light.   Neck:      Thyroid: No thyromegaly.      Vascular: No JVD.      Trachea: No tracheal deviation.   Cardiovascular:      Rate and Rhythm: Normal rate and regular rhythm.      Heart sounds: No murmur heard.     No friction rub. No gallop.   Pulmonary:      Effort: Pulmonary effort is normal. No respiratory distress.      Breath sounds: Normal breath sounds. No stridor. No wheezing or rales.   Chest:      Chest wall: No tenderness.   Abdominal:      General: Bowel sounds are normal. There is no distension.      Palpations: Abdomen is soft. There is no mass.      Tenderness: There is  no abdominal tenderness. There is no guarding or rebound.   Musculoskeletal:         General: No tenderness. Normal range of motion.      Cervical back: Normal range of motion and neck supple.   Lymphadenopathy:      Cervical: No cervical adenopathy.   Skin:     General: Skin is warm.      Coloration: Skin is not pale.      Findings: No erythema or rash.   Neurological:      Mental Status: He is alert and oriented to person, place, and time.      Cranial Nerves: No cranial nerve deficit.      Motor: No abnormal muscle tone.      Coordination: Coordination normal.      Deep Tendon Reflexes: Reflexes are normal and symmetric. Reflexes normal.   Psychiatric:         Behavior: Behavior normal.         Thought Content: Thought content normal.         Judgment: Judgment normal.       Assessment:     1. Human immunodeficiency virus (HIV) disease :  labs reviewed.  Viral load is suppressed.  He is to continue on juluca.  Wants to wait to fall to get covid booster.   2. Mixed hyperlipidemia :  patient wants to hold off on statin.  Will re-check lipids prior to his next visit.  Suggested he increase cardiovascular exercise.  Suggested he get a stationary bike.   3. Primary insomnia :  discussed sleep hygiene.  He takes trazodone as needed.       Plan:     Human immunodeficiency virus (HIV) disease    Mixed hyperlipidemia  -     Lipid Panel; Future; Expected date: 03/22/2024    Primary insomnia

## 2024-04-01 DIAGNOSIS — B20 HUMAN IMMUNODEFICIENCY VIRUS (HIV) DISEASE: ICD-10-CM

## 2024-04-01 RX ORDER — DOLUTEGRAVIR SODIUM AND RILPIVIRINE HYDROCHLORIDE 50; 25 MG/1; MG/1
1 TABLET, FILM COATED ORAL DAILY
Qty: 30 TABLET | Refills: 11 | Status: ACTIVE | OUTPATIENT
Start: 2024-04-01 | End: 2025-03-27

## 2024-04-17 DIAGNOSIS — F51.01 PRIMARY INSOMNIA: ICD-10-CM

## 2024-04-17 RX ORDER — TRAZODONE HYDROCHLORIDE 50 MG/1
TABLET ORAL
Qty: 30 TABLET | Refills: 3 | Status: SHIPPED | OUTPATIENT
Start: 2024-04-17

## 2024-10-01 ENCOUNTER — TELEPHONE (OUTPATIENT)
Dept: INFECTIOUS DISEASES | Facility: CLINIC | Age: 59
End: 2024-10-01
Payer: COMMERCIAL

## 2024-10-01 NOTE — TELEPHONE ENCOUNTER
----- Message from Kendal Gaming sent at 9/27/2024  2:56 PM CDT -----  Margoth Cope Staff  Caller: [phone] (Today,  2:44 PM)  Type:  Needs Medical Advice    Who Called: Oscar Moore called requesting to speak with Anahy  Requesting to schedule labs in [place] a week from tomorrow Saturday 10/6    Requesting to an appointment with provider on 10/12 in the  afternoon or 10/17 afternoon  Would the patient rather a call back or a response via [facility]? both  Best Call Back Number: [phone]    Additional Information:

## 2024-10-29 ENCOUNTER — TELEPHONE (OUTPATIENT)
Dept: INFECTIOUS DISEASES | Facility: CLINIC | Age: 59
End: 2024-10-29
Payer: COMMERCIAL

## 2024-10-30 ENCOUNTER — TELEPHONE (OUTPATIENT)
Dept: INFECTIOUS DISEASES | Facility: CLINIC | Age: 59
End: 2024-10-30
Payer: COMMERCIAL

## 2024-10-30 DIAGNOSIS — B20 HUMAN IMMUNODEFICIENCY VIRUS (HIV) DISEASE: Primary | ICD-10-CM

## 2024-10-30 DIAGNOSIS — E78.2 MIXED HYPERLIPIDEMIA: ICD-10-CM

## 2024-12-07 ENCOUNTER — LAB VISIT (OUTPATIENT)
Dept: LAB | Facility: HOSPITAL | Age: 59
End: 2024-12-07
Attending: INTERNAL MEDICINE
Payer: COMMERCIAL

## 2024-12-07 DIAGNOSIS — B20 HUMAN IMMUNODEFICIENCY VIRUS (HIV) DISEASE: ICD-10-CM

## 2024-12-07 DIAGNOSIS — E78.2 MIXED HYPERLIPIDEMIA: ICD-10-CM

## 2024-12-07 LAB
ALBUMIN SERPL BCP-MCNC: 4.5 G/DL (ref 3.5–5.2)
ALP SERPL-CCNC: 60 U/L (ref 40–150)
ALT SERPL W/O P-5'-P-CCNC: 39 U/L (ref 10–44)
ANION GAP SERPL CALC-SCNC: 11 MMOL/L (ref 8–16)
AST SERPL-CCNC: 36 U/L (ref 10–40)
BASOPHILS # BLD AUTO: 0.07 K/UL (ref 0–0.2)
BASOPHILS NFR BLD: 1 % (ref 0–1.9)
BILIRUB SERPL-MCNC: 0.9 MG/DL (ref 0.1–1)
BUN SERPL-MCNC: 13 MG/DL (ref 6–20)
CALCIUM SERPL-MCNC: 9.7 MG/DL (ref 8.7–10.5)
CHLORIDE SERPL-SCNC: 106 MMOL/L (ref 95–110)
CHOLEST SERPL-MCNC: 237 MG/DL (ref 120–199)
CHOLEST/HDLC SERPL: 3.4 {RATIO} (ref 2–5)
CO2 SERPL-SCNC: 25 MMOL/L (ref 23–29)
COMPLEXED PSA SERPL-MCNC: 0.39 NG/ML (ref 0–4)
CREAT SERPL-MCNC: 1.1 MG/DL (ref 0.5–1.4)
DIFFERENTIAL METHOD BLD: NORMAL
EOSINOPHIL # BLD AUTO: 0.2 K/UL (ref 0–0.5)
EOSINOPHIL NFR BLD: 2.8 % (ref 0–8)
ERYTHROCYTE [DISTWIDTH] IN BLOOD BY AUTOMATED COUNT: 12.5 % (ref 11.5–14.5)
EST. GFR  (NO RACE VARIABLE): >60 ML/MIN/1.73 M^2
GLUCOSE SERPL-MCNC: 107 MG/DL (ref 70–110)
HCT VFR BLD AUTO: 49.4 % (ref 40–54)
HDLC SERPL-MCNC: 69 MG/DL (ref 40–75)
HDLC SERPL: 29.1 % (ref 20–50)
HGB BLD-MCNC: 16.6 G/DL (ref 14–18)
IMM GRANULOCYTES # BLD AUTO: 0.02 K/UL (ref 0–0.04)
IMM GRANULOCYTES NFR BLD AUTO: 0.3 % (ref 0–0.5)
LDLC SERPL CALC-MCNC: 149.6 MG/DL (ref 63–159)
LYMPHOCYTES # BLD AUTO: 2.6 K/UL (ref 1–4.8)
LYMPHOCYTES NFR BLD: 38.2 % (ref 18–48)
MCH RBC QN AUTO: 30.9 PG (ref 27–31)
MCHC RBC AUTO-ENTMCNC: 33.6 G/DL (ref 32–36)
MCV RBC AUTO: 92 FL (ref 82–98)
MONOCYTES # BLD AUTO: 0.7 K/UL (ref 0.3–1)
MONOCYTES NFR BLD: 9.7 % (ref 4–15)
NEUTROPHILS # BLD AUTO: 3.2 K/UL (ref 1.8–7.7)
NEUTROPHILS NFR BLD: 48 % (ref 38–73)
NONHDLC SERPL-MCNC: 168 MG/DL
NRBC BLD-RTO: 0 /100 WBC
PLATELET # BLD AUTO: 224 K/UL (ref 150–450)
PMV BLD AUTO: 9.8 FL (ref 9.2–12.9)
POTASSIUM SERPL-SCNC: 4.1 MMOL/L (ref 3.5–5.1)
PROT SERPL-MCNC: 7.9 G/DL (ref 6–8.4)
RBC # BLD AUTO: 5.37 M/UL (ref 4.6–6.2)
SODIUM SERPL-SCNC: 142 MMOL/L (ref 136–145)
TRIGL SERPL-MCNC: 92 MG/DL (ref 30–150)
WBC # BLD AUTO: 6.72 K/UL (ref 3.9–12.7)

## 2024-12-07 PROCEDURE — 84153 ASSAY OF PSA TOTAL: CPT | Performed by: INTERNAL MEDICINE

## 2024-12-07 PROCEDURE — 80061 LIPID PANEL: CPT | Performed by: INTERNAL MEDICINE

## 2024-12-07 PROCEDURE — 36415 COLL VENOUS BLD VENIPUNCTURE: CPT | Performed by: INTERNAL MEDICINE

## 2024-12-07 PROCEDURE — 87536 HIV-1 QUANT&REVRSE TRNSCRPJ: CPT | Performed by: INTERNAL MEDICINE

## 2024-12-07 PROCEDURE — 80053 COMPREHEN METABOLIC PANEL: CPT | Performed by: INTERNAL MEDICINE

## 2024-12-07 PROCEDURE — 85025 COMPLETE CBC W/AUTO DIFF WBC: CPT | Performed by: INTERNAL MEDICINE

## 2024-12-11 LAB — HIV1 RNA # PLAS NAA DL=20: NORMAL COPIES/ML

## 2024-12-16 ENCOUNTER — OFFICE VISIT (OUTPATIENT)
Dept: INFECTIOUS DISEASES | Facility: CLINIC | Age: 59
End: 2024-12-16
Payer: COMMERCIAL

## 2024-12-16 ENCOUNTER — LAB VISIT (OUTPATIENT)
Dept: LAB | Facility: HOSPITAL | Age: 59
End: 2024-12-16
Attending: INTERNAL MEDICINE
Payer: COMMERCIAL

## 2024-12-16 VITALS
TEMPERATURE: 98 F | SYSTOLIC BLOOD PRESSURE: 142 MMHG | HEIGHT: 72 IN | WEIGHT: 173.75 LBS | HEART RATE: 71 BPM | BODY MASS INDEX: 23.53 KG/M2 | DIASTOLIC BLOOD PRESSURE: 79 MMHG

## 2024-12-16 DIAGNOSIS — E78.2 MIXED HYPERLIPIDEMIA: ICD-10-CM

## 2024-12-16 DIAGNOSIS — B20 HUMAN IMMUNODEFICIENCY VIRUS (HIV) DISEASE: Primary | ICD-10-CM

## 2024-12-16 DIAGNOSIS — B20 HUMAN IMMUNODEFICIENCY VIRUS (HIV) DISEASE: ICD-10-CM

## 2024-12-16 DIAGNOSIS — N52.8 OTHER MALE ERECTILE DYSFUNCTION: ICD-10-CM

## 2024-12-16 LAB
CHOLEST SERPL-MCNC: 223 MG/DL (ref 120–199)
CHOLEST/HDLC SERPL: 3.6 {RATIO} (ref 2–5)
HDLC SERPL-MCNC: 62 MG/DL (ref 40–75)
HDLC SERPL: 27.8 % (ref 20–50)
LDLC SERPL CALC-MCNC: 137.4 MG/DL (ref 63–159)
NONHDLC SERPL-MCNC: 161 MG/DL
TRIGL SERPL-MCNC: 118 MG/DL (ref 30–150)

## 2024-12-16 PROCEDURE — 80061 LIPID PANEL: CPT | Performed by: INTERNAL MEDICINE

## 2024-12-16 PROCEDURE — 3078F DIAST BP <80 MM HG: CPT | Mod: CPTII,S$GLB,, | Performed by: INTERNAL MEDICINE

## 2024-12-16 PROCEDURE — 86361 T CELL ABSOLUTE COUNT: CPT | Performed by: INTERNAL MEDICINE

## 2024-12-16 PROCEDURE — 3044F HG A1C LEVEL LT 7.0%: CPT | Mod: CPTII,S$GLB,, | Performed by: INTERNAL MEDICINE

## 2024-12-16 PROCEDURE — 99999 PR PBB SHADOW E&M-EST. PATIENT-LVL IV: CPT | Mod: PBBFAC,,, | Performed by: INTERNAL MEDICINE

## 2024-12-16 PROCEDURE — 3077F SYST BP >= 140 MM HG: CPT | Mod: CPTII,S$GLB,, | Performed by: INTERNAL MEDICINE

## 2024-12-16 PROCEDURE — 1160F RVW MEDS BY RX/DR IN RCRD: CPT | Mod: CPTII,S$GLB,, | Performed by: INTERNAL MEDICINE

## 2024-12-16 PROCEDURE — 36415 COLL VENOUS BLD VENIPUNCTURE: CPT | Performed by: INTERNAL MEDICINE

## 2024-12-16 PROCEDURE — 1159F MED LIST DOCD IN RCRD: CPT | Mod: CPTII,S$GLB,, | Performed by: INTERNAL MEDICINE

## 2024-12-16 PROCEDURE — 3008F BODY MASS INDEX DOCD: CPT | Mod: CPTII,S$GLB,, | Performed by: INTERNAL MEDICINE

## 2024-12-16 PROCEDURE — 99214 OFFICE O/P EST MOD 30 MIN: CPT | Mod: S$GLB,,, | Performed by: INTERNAL MEDICINE

## 2024-12-16 RX ORDER — SILDENAFIL 100 MG/1
TABLET, FILM COATED ORAL
Qty: 14 TABLET | Refills: 2 | Status: SHIPPED | OUTPATIENT
Start: 2024-12-16

## 2024-12-16 RX ORDER — ROSUVASTATIN CALCIUM 10 MG/1
10 TABLET, COATED ORAL DAILY
Qty: 90 TABLET | Refills: 3 | Status: SHIPPED | OUTPATIENT
Start: 2024-12-16 | End: 2025-12-16

## 2024-12-16 NOTE — PROGRESS NOTES
Subjective     Patient ID: Oscar Gibbs is a 59 y.o. male.    Chief Complaint:Follow-up and HIV Positive/AIDS      History of Present Illness    59 year old male who is living with HIV.  He is here today for routine follow up.    Interval History  - 1  quit, 1  quit.  Looking to hire.    - Niece doing well in medical school in [facility].  - Mother is 95 years old. Currently has bronchitis.  - Got influenza vaccine and covid vaccine in the fall.    - Gets 4.5 to 5 hours of sleep in 24 hour span.    - Doesn't get any cardiovascular exercise.  - Cataract surgery on both eyes is planned for bilateral eyes.      Medication  Juluca  Trazodone  Melatonin  Fiber pills.    Social History  Born and raised in [place], [place].  Works as a .  Father is .  Mother is 93.  Oldest brother is .  Middle brother is a physician.  Started smoking in his mid twenties.  Smoked about 1/2 ppd.  Quit smoking in . Drinks about 2 glasses of wine per day with diner.  No illicits.   He has been in a committed monogamous relationship with his male partner.      Review of Systems   Constitutional: Negative for chills, decreased appetite, fever, malaise/fatigue, night sweats, weight gain and weight loss.   HENT:  Positive for tinnitus. Negative for congestion, ear pain, hearing loss, hoarse voice and sore throat.    Eyes:  Positive for redness. Negative for blurred vision and visual disturbance.   Cardiovascular:  Negative for chest pain, leg swelling and palpitations.   Respiratory:  Negative for cough, hemoptysis, shortness of breath, sputum production and wheezing.    Hematologic/Lymphatic: Negative for adenopathy. Does not bruise/bleed easily.   Skin:  Negative for dry skin, itching, rash and suspicious lesions.   Musculoskeletal:  Negative for back pain, joint pain, myalgias and neck pain.   Gastrointestinal:  Negative for abdominal pain, constipation, diarrhea, heartburn, nausea and vomiting.    Genitourinary:  Negative for dysuria, flank pain, frequency, hematuria, hesitancy and urgency.   Neurological:  Negative for dizziness, headaches, numbness, paresthesias and weakness.   Psychiatric/Behavioral:  Negative for depression and memory loss. The patient has insomnia and is nervous/anxious.       Objective   Physical Exam  Vitals and nursing note reviewed.   Constitutional:       General: He is not in acute distress.     Appearance: He is well-developed. He is not diaphoretic.   HENT:      Head: Normocephalic and atraumatic.      Right Ear: External ear normal.      Left Ear: External ear normal.      Nose: Nose normal.      Mouth/Throat:      Pharynx: No oropharyngeal exudate.   Eyes:      General: No scleral icterus.        Right eye: No discharge.         Left eye: No discharge.      Conjunctiva/sclera: Conjunctivae normal.      Pupils: Pupils are equal, round, and reactive to light.   Neck:      Thyroid: No thyromegaly.      Vascular: No JVD.      Trachea: No tracheal deviation.   Cardiovascular:      Rate and Rhythm: Normal rate and regular rhythm.      Heart sounds: No murmur heard.     No friction rub. No gallop.   Pulmonary:      Effort: Pulmonary effort is normal. No respiratory distress.      Breath sounds: Normal breath sounds. No stridor. No wheezing or rales.   Chest:      Chest wall: No tenderness.   Abdominal:      General: Bowel sounds are normal. There is no distension.      Palpations: Abdomen is soft. There is no mass.      Tenderness: There is no abdominal tenderness. There is no guarding or rebound.   Musculoskeletal:         General: No tenderness. Normal range of motion.      Cervical back: Normal range of motion and neck supple.   Lymphadenopathy:      Cervical: No cervical adenopathy.   Skin:     General: Skin is warm.      Coloration: Skin is not pale.      Findings: No erythema or rash.   Neurological:      Mental Status: He is alert and oriented to person, place, and time.       Cranial Nerves: No cranial nerve deficit.      Motor: No abnormal muscle tone.      Coordination: Coordination normal.      Deep Tendon Reflexes: Reflexes are normal and symmetric. Reflexes normal.   Psychiatric:         Behavior: Behavior normal.         Thought Content: Thought content normal.         Judgment: Judgment normal.            Assessment and Plan     1. Human immunodeficiency virus (HIV) disease :  Viral load is suppressed.  Will check CD4 count.  He is to continue on juluca.   2. Mixed hyperlipidemia:  Will start patient on rosuvastatin.    3. Other male erectile dysfunction :  Will manage with viagra.       Plan:  Human immunodeficiency virus (HIV) disease  -     CD4 T-[place] Cells; Future; Expected date: 12/16/2024    Mixed hyperlipidemia  -     rosuvastatin (CRESTOR) 10 MG tablet; Take 1 tablet (10 mg total) by mouth once daily.  Dispense: 90 tablet; Refill: 3    Other male erectile dysfunction  -     sildenafiL (VIAGRA) 100 MG tablet; TAKE 1 TABLET BY MOUTH AS NEEDED  Dispense: 14 tablet; Refill: 2              Middle brother had polyps.  Had his first colonoscopy in 40s.  Gets it done every 5 years.  Last was 5 years ago.  Will get it done in [facility].      BP rechecked at 129/74

## 2024-12-17 LAB
CD3+CD4+ CELLS # BLD: 1241 CELLS/UL (ref 300–1400)
CD3+CD4+ CELLS NFR BLD: 39.2 % (ref 28–57)

## 2024-12-29 DIAGNOSIS — F51.01 PRIMARY INSOMNIA: ICD-10-CM

## 2024-12-30 RX ORDER — TRAZODONE HYDROCHLORIDE 50 MG/1
TABLET ORAL
Qty: 30 TABLET | Refills: 3 | Status: SHIPPED | OUTPATIENT
Start: 2024-12-30

## 2025-02-03 ENCOUNTER — TELEPHONE (OUTPATIENT)
Dept: INFECTIOUS DISEASES | Facility: CLINIC | Age: 60
End: 2025-02-03
Payer: COMMERCIAL

## 2025-02-03 NOTE — TELEPHONE ENCOUNTER
Spoke with pt and explained why his labs could not be collected on Saturday's due to them being send-outs, which are only collected Mon-Thurs.

## 2025-02-03 NOTE — TELEPHONE ENCOUNTER
----- Message from Med Assistant Anahy sent at 1/27/2025  3:49 PM CST -----  Chapincito Norman MD Lewis, Arlene, MA  Caller: Unspecified (3 days ago,  5:54 PM)  Hello,    Can you see if there is an [facility] lab near [place] that would do the CD4 counts on the weekend?  This was an issue at the lab he goes to cause they couldn't do the CD4 counts on Saturday when he does his labs.    Obi

## 2025-02-03 NOTE — TELEPHONE ENCOUNTER
----- Message from Med Assistant Anahy sent at 1/27/2025  3:49 PM CST -----  Chapincito Norman MD Lewis, Arlene, MA  Caller: Unspecified (3 days ago,  5:54 PM)  Hello,    Can you see if there is an [facility] lab near [place] that would do the CD4 counts on the weekend?  This was an issue at the lab he goes to cause they couldn't do the CD4 counts on Saturday when he does his labs.    Obi

## 2025-03-17 DIAGNOSIS — B20 HUMAN IMMUNODEFICIENCY VIRUS (HIV) DISEASE: ICD-10-CM

## 2025-03-17 RX ORDER — DOLUTEGRAVIR SODIUM AND RILPIVIRINE HYDROCHLORIDE 50; 25 MG/1; MG/1
1 TABLET, FILM COATED ORAL DAILY
Qty: 30 TABLET | Refills: 11 | Status: ACTIVE | OUTPATIENT
Start: 2025-03-17 | End: 2026-03-12

## 2025-05-06 ENCOUNTER — TELEPHONE (OUTPATIENT)
Dept: INFECTIOUS DISEASES | Facility: CLINIC | Age: 60
End: 2025-05-06
Payer: COMMERCIAL

## 2025-05-06 NOTE — TELEPHONE ENCOUNTER
Pt is asking to be seen on a Friday afternoon. I have him scheduled in August but he would like to be seen sooner.

## 2025-05-06 NOTE — TELEPHONE ENCOUNTER
----- Message from Epifanio sent at 5/6/2025  1:32 PM CDT -----  Regarding: Scheduling Request  Contact: [phone]  Scheduling RequestAppt Type:  EpDate/Time Preference:June 27 or July 11Caller Name:PtContact Preference: [phone] Comment: 6 mo follow upPlease Call to Advise,Thank you.  ----- Message -----  From: Epifanio Arboleda  Sent: 5/6/2025   1:34 PM CDT  To: Ester Beckham Staff  Subject: Scheduling Request                               Scheduling RequestAppt Type:  EpDate/Time Preference:June 27Caller Name:PtContact Preference: [phone] Comment: 6 mo follow upPlease Call to Advise,Thank you.  ----- Message -----  From: Epifanio Arboleda  Sent: 5/6/2025   1:34 PM CDT  To: Ester Beckham Staff  Subject: Scheduling Request                               Scheduling RequestAppt Type:  EpDate/Time Preference:Next availableCaller Name:PtContact Preference: [phone] Comment: 6 mo follow upPlease Call to Advise,Thank you.

## 2025-07-16 ENCOUNTER — PATIENT MESSAGE (OUTPATIENT)
Dept: ADMINISTRATIVE | Facility: OTHER | Age: 60
End: 2025-07-16
Payer: COMMERCIAL

## 2025-07-16 ENCOUNTER — PATIENT MESSAGE (OUTPATIENT)
Dept: INFECTIOUS DISEASES | Facility: CLINIC | Age: 60
End: 2025-07-16
Payer: COMMERCIAL

## 2025-07-19 ENCOUNTER — LAB VISIT (OUTPATIENT)
Dept: LAB | Facility: HOSPITAL | Age: 60
End: 2025-07-19
Attending: INTERNAL MEDICINE
Payer: COMMERCIAL

## 2025-07-19 DIAGNOSIS — B20 HUMAN IMMUNODEFICIENCY VIRUS (HIV) DISEASE: ICD-10-CM

## 2025-07-19 LAB
ABSOLUTE EOSINOPHIL (SMH): 0.22 K/UL
ABSOLUTE MONOCYTE (SMH): 0.58 K/UL (ref 0.3–1)
ABSOLUTE NEUTROPHIL COUNT (SMH): 2.8 K/UL (ref 1.8–7.7)
ALBUMIN SERPL-MCNC: 4.7 G/DL (ref 3.5–5.2)
ALP SERPL-CCNC: 55 UNIT/L (ref 40–150)
ALT SERPL-CCNC: 40 UNIT/L (ref 10–44)
ANION GAP (SMH): 10 MMOL/L (ref 8–16)
AST SERPL-CCNC: 48 UNIT/L (ref 11–45)
BASOPHILS # BLD AUTO: 0.06 K/UL
BASOPHILS NFR BLD AUTO: 1 %
BILIRUB SERPL-MCNC: 0.6 MG/DL (ref 0.1–1)
BILIRUB UR QL STRIP.AUTO: NEGATIVE
BUN SERPL-MCNC: 13 MG/DL (ref 6–20)
CALCIUM SERPL-MCNC: 9.4 MG/DL (ref 8.7–10.5)
CHLORIDE SERPL-SCNC: 106 MMOL/L (ref 95–110)
CHOLEST SERPL-MCNC: 165 MG/DL (ref 120–199)
CHOLEST/HDLC SERPL: 2.6 {RATIO} (ref 2–5)
CLARITY UR: CLEAR
CO2 SERPL-SCNC: 25 MMOL/L (ref 23–29)
COLOR UR AUTO: YELLOW
CREAT SERPL-MCNC: 1.1 MG/DL (ref 0.5–1.4)
ERYTHROCYTE [DISTWIDTH] IN BLOOD BY AUTOMATED COUNT: 12.4 % (ref 11.5–14.5)
GFR SERPLBLD CREATININE-BSD FMLA CKD-EPI: >60 ML/MIN/1.73/M2
GLUCOSE SERPL-MCNC: 107 MG/DL (ref 70–110)
GLUCOSE UR QL STRIP: NEGATIVE
HCT VFR BLD AUTO: 46.6 % (ref 40–54)
HDLC SERPL-MCNC: 63 MG/DL (ref 40–75)
HDLC SERPL: 38.2 % (ref 20–50)
HGB BLD-MCNC: 15.9 GM/DL (ref 14–18)
HGB UR QL STRIP: NEGATIVE
IMM GRANULOCYTES # BLD AUTO: 0.01 K/UL (ref 0–0.04)
IMM GRANULOCYTES NFR BLD AUTO: 0.2 % (ref 0–0.5)
KETONES UR QL STRIP: NEGATIVE
LDLC SERPL CALC-MCNC: 88 MG/DL (ref 63–159)
LEUKOCYTE ESTERASE UR QL STRIP: NEGATIVE
LYMPHOCYTES # BLD AUTO: 2.33 K/UL (ref 1–4.8)
MCH RBC QN AUTO: 31.1 PG (ref 27–31)
MCHC RBC AUTO-ENTMCNC: 34.1 G/DL (ref 32–36)
MCV RBC AUTO: 91 FL (ref 82–98)
NITRITE UR QL STRIP: NEGATIVE
NONHDLC SERPL-MCNC: 102 MG/DL
NUCLEATED RBC (/100WBC) (SMH): 0 /100 WBC
PH UR STRIP: 6 [PH]
PLATELET # BLD AUTO: 191 K/UL (ref 150–450)
PMV BLD AUTO: 10 FL (ref 9.2–12.9)
POTASSIUM SERPL-SCNC: 4.4 MMOL/L (ref 3.5–5.1)
PROT SERPL-MCNC: 7.7 GM/DL (ref 6–8.4)
PROT UR QL STRIP: NEGATIVE
RBC # BLD AUTO: 5.12 M/UL (ref 4.6–6.2)
RELATIVE EOSINOPHIL (SMH): 3.6 % (ref 0–8)
RELATIVE LYMPHOCYTE (SMH): 38.6 % (ref 18–48)
RELATIVE MONOCYTE (SMH): 9.6 % (ref 4–15)
RELATIVE NEUTROPHIL (SMH): 47 % (ref 38–73)
SODIUM SERPL-SCNC: 141 MMOL/L (ref 136–145)
SP GR UR STRIP: 1.02
TRIGL SERPL-MCNC: 70 MG/DL (ref 30–150)
UROBILINOGEN UR STRIP-ACNC: NEGATIVE EU/DL
WBC # BLD AUTO: 6.04 K/UL (ref 3.9–12.7)

## 2025-07-19 PROCEDURE — 82465 ASSAY BLD/SERUM CHOLESTEROL: CPT

## 2025-07-19 PROCEDURE — 85025 COMPLETE CBC W/AUTO DIFF WBC: CPT

## 2025-07-19 PROCEDURE — 81003 URINALYSIS AUTO W/O SCOPE: CPT

## 2025-07-19 PROCEDURE — 80053 COMPREHEN METABOLIC PANEL: CPT

## 2025-07-19 PROCEDURE — 87536 HIV-1 QUANT&REVRSE TRNSCRPJ: CPT

## 2025-07-19 PROCEDURE — 36415 COLL VENOUS BLD VENIPUNCTURE: CPT

## 2025-07-19 PROCEDURE — 86360 T CELL ABSOLUTE COUNT/RATIO: CPT

## 2025-07-21 LAB — HIV1 RNA SERPL NAA+PROBE-LOG#: NOT DETECTED {LOG_COPIES}/ML

## 2025-07-22 LAB
CD45 CELLS # BLD: NORMAL /UL
FLOW CYTOMETRY SPECIALIST REVIEW: NORMAL

## 2025-08-01 ENCOUNTER — LAB VISIT (OUTPATIENT)
Dept: LAB | Facility: HOSPITAL | Age: 60
End: 2025-08-01
Attending: INTERNAL MEDICINE
Payer: COMMERCIAL

## 2025-08-01 ENCOUNTER — OFFICE VISIT (OUTPATIENT)
Dept: INFECTIOUS DISEASES | Facility: CLINIC | Age: 60
End: 2025-08-01
Payer: COMMERCIAL

## 2025-08-01 VITALS
HEART RATE: 57 BPM | TEMPERATURE: 98 F | HEIGHT: 72 IN | BODY MASS INDEX: 24.63 KG/M2 | DIASTOLIC BLOOD PRESSURE: 78 MMHG | WEIGHT: 181.88 LBS | SYSTOLIC BLOOD PRESSURE: 129 MMHG

## 2025-08-01 DIAGNOSIS — Z12.5 PROSTATE CANCER SCREENING: ICD-10-CM

## 2025-08-01 DIAGNOSIS — Z21 ASYMPTOMATIC HUMAN IMMUNODEFICIENCY VIRUS (HIV) INFECTION STATUS: Primary | ICD-10-CM

## 2025-08-01 DIAGNOSIS — Z21 ASYMPTOMATIC HUMAN IMMUNODEFICIENCY VIRUS (HIV) INFECTION STATUS: ICD-10-CM

## 2025-08-01 DIAGNOSIS — E78.2 MIXED HYPERLIPIDEMIA: ICD-10-CM

## 2025-08-01 LAB — PSA SERPL-MCNC: 0.13 NG/ML

## 2025-08-01 PROCEDURE — 1159F MED LIST DOCD IN RCRD: CPT | Mod: CPTII,S$GLB,, | Performed by: INTERNAL MEDICINE

## 2025-08-01 PROCEDURE — 84153 ASSAY OF PSA TOTAL: CPT

## 2025-08-01 PROCEDURE — 99999 PR PBB SHADOW E&M-EST. PATIENT-LVL IV: CPT | Mod: PBBFAC,,, | Performed by: INTERNAL MEDICINE

## 2025-08-01 PROCEDURE — 86361 T CELL ABSOLUTE COUNT: CPT

## 2025-08-01 PROCEDURE — 99214 OFFICE O/P EST MOD 30 MIN: CPT | Mod: S$GLB,,, | Performed by: INTERNAL MEDICINE

## 2025-08-01 PROCEDURE — 3074F SYST BP LT 130 MM HG: CPT | Mod: CPTII,S$GLB,, | Performed by: INTERNAL MEDICINE

## 2025-08-01 PROCEDURE — 3078F DIAST BP <80 MM HG: CPT | Mod: CPTII,S$GLB,, | Performed by: INTERNAL MEDICINE

## 2025-08-01 PROCEDURE — 1160F RVW MEDS BY RX/DR IN RCRD: CPT | Mod: CPTII,S$GLB,, | Performed by: INTERNAL MEDICINE

## 2025-08-01 PROCEDURE — 36415 COLL VENOUS BLD VENIPUNCTURE: CPT

## 2025-08-01 PROCEDURE — 3008F BODY MASS INDEX DOCD: CPT | Mod: CPTII,S$GLB,, | Performed by: INTERNAL MEDICINE

## 2025-08-01 RX ORDER — ROSUVASTATIN CALCIUM 5 MG/1
5 TABLET, COATED ORAL DAILY
Qty: 90 TABLET | Refills: 3 | Status: SHIPPED | OUTPATIENT
Start: 2025-08-01 | End: 2026-08-01

## 2025-08-04 LAB
CD3+CD4+ CELLS # SPEC: 1272 CELLS/UL (ref 300–1400)
CD3+CD4+ CELLS NFR BLD: 37.61 % (ref 28–57)
LABORATORY COMMENT REPORT: NORMAL

## 2025-08-05 ENCOUNTER — PATIENT MESSAGE (OUTPATIENT)
Dept: INFECTIOUS DISEASES | Facility: CLINIC | Age: 60
End: 2025-08-05
Payer: COMMERCIAL